# Patient Record
Sex: FEMALE | Race: WHITE | NOT HISPANIC OR LATINO
[De-identification: names, ages, dates, MRNs, and addresses within clinical notes are randomized per-mention and may not be internally consistent; named-entity substitution may affect disease eponyms.]

---

## 2020-12-02 ENCOUNTER — APPOINTMENT (OUTPATIENT)
Dept: SURGICAL ONCOLOGY | Facility: CLINIC | Age: 85
End: 2020-12-02
Payer: MEDICARE

## 2020-12-02 VITALS
HEART RATE: 91 BPM | HEIGHT: 66 IN | OXYGEN SATURATION: 98 % | BODY MASS INDEX: 24.11 KG/M2 | WEIGHT: 150 LBS | DIASTOLIC BLOOD PRESSURE: 81 MMHG | SYSTOLIC BLOOD PRESSURE: 154 MMHG

## 2020-12-02 DIAGNOSIS — Z85.820 PERSONAL HISTORY OF MALIGNANT MELANOMA OF SKIN: ICD-10-CM

## 2020-12-02 DIAGNOSIS — Z84.89 FAMILY HISTORY OF OTHER SPECIFIED CONDITIONS: ICD-10-CM

## 2020-12-02 DIAGNOSIS — Z85.828 PERSONAL HISTORY OF OTHER MALIGNANT NEOPLASM OF SKIN: ICD-10-CM

## 2020-12-02 DIAGNOSIS — Z80.0 FAMILY HISTORY OF MALIGNANT NEOPLASM OF DIGESTIVE ORGANS: ICD-10-CM

## 2020-12-02 DIAGNOSIS — Z78.9 OTHER SPECIFIED HEALTH STATUS: ICD-10-CM

## 2020-12-02 DIAGNOSIS — Z86.59 PERSONAL HISTORY OF OTHER MENTAL AND BEHAVIORAL DISORDERS: ICD-10-CM

## 2020-12-02 DIAGNOSIS — Z80.42 FAMILY HISTORY OF MALIGNANT NEOPLASM OF PROSTATE: ICD-10-CM

## 2020-12-02 DIAGNOSIS — Z87.39 PERSONAL HISTORY OF OTHER DISEASES OF THE MUSCULOSKELETAL SYSTEM AND CONNECTIVE TISSUE: ICD-10-CM

## 2020-12-02 DIAGNOSIS — Z86.69 PERSONAL HISTORY OF OTHER DISEASES OF THE NERVOUS SYSTEM AND SENSE ORGANS: ICD-10-CM

## 2020-12-02 DIAGNOSIS — Z86.79 PERSONAL HISTORY OF OTHER DISEASES OF THE CIRCULATORY SYSTEM: ICD-10-CM

## 2020-12-02 DIAGNOSIS — Z85.3 PERSONAL HISTORY OF MALIGNANT NEOPLASM OF BREAST: ICD-10-CM

## 2020-12-02 DIAGNOSIS — Z85.850 PERSONAL HISTORY OF MALIGNANT NEOPLASM OF THYROID: ICD-10-CM

## 2020-12-02 PROCEDURE — 99205 OFFICE O/P NEW HI 60 MIN: CPT | Mod: 25

## 2020-12-02 PROCEDURE — 11640 EXC F/E/E/N/L MAL+MRG 0.5CM<: CPT

## 2020-12-02 NOTE — ADDENDUM
[FreeTextEntry1] : I, Bina Li, acted solely as a scribe for Dr. Jay Woodson on this date 12/02/2020.\par

## 2020-12-02 NOTE — ASSESSMENT
[FreeTextEntry1] : T1a left cheek melanoma\par Outside report has incorrect staging (T1b)\par Recommend wide excision with plastic surgery reconstruction  - referred to Dr. Sanjay Krueger \par Punch biopsies performed today of the primary lesion to confirm staging and of the margins given the diffuse nature of the lesion and proximity to the mouth.\par Follow up with pathology report.\par Surgical procedure discussed in detail\par All questions answered

## 2020-12-02 NOTE — HISTORY OF PRESENT ILLNESS
[de-identified] : 84 y/o female presents with an initial consultation for a malignant melanoma. Pt. notes pigmented lesion left cheek near mouth for the past 1-2 yrs.  Pt. 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 \par

## 2020-12-02 NOTE — CONSULT LETTER
[Consult Letter:] : I had the pleasure of evaluating your patient, [unfilled]. [Please see my note below.] : Please see my note below. [Sincerely,] : Sincerely, [Dear  ___] : Dear  [unfilled], [FreeTextEntry3] : Jay Woodson MD FACS\par  [DrMadelin  ___] : Dr. COLEMAN

## 2020-12-02 NOTE — PROCEDURE
[FreeTextEntry1] : 3 mm punch biopsies performed on left cheek under sterile conditions using 1% plain lidocaine, no epinephrine. \par Left cheek pigmented lesion and margins 12,3,6,9 o'clock performed. \par Incision closed with 4 Monocryl simple sutures. \par Sterile dressing applied.\par

## 2020-12-11 ENCOUNTER — NON-APPOINTMENT (OUTPATIENT)
Age: 85
End: 2020-12-11

## 2020-12-11 LAB — CORE LAB BIOPSY: NORMAL

## 2020-12-17 ENCOUNTER — OUTPATIENT (OUTPATIENT)
Dept: OUTPATIENT SERVICES | Facility: HOSPITAL | Age: 85
LOS: 1 days | End: 2020-12-17
Payer: MEDICARE

## 2020-12-17 VITALS
HEART RATE: 81 BPM | SYSTOLIC BLOOD PRESSURE: 140 MMHG | DIASTOLIC BLOOD PRESSURE: 90 MMHG | WEIGHT: 149.03 LBS | RESPIRATION RATE: 16 BRPM | OXYGEN SATURATION: 98 % | HEIGHT: 65.25 IN | TEMPERATURE: 99 F

## 2020-12-17 DIAGNOSIS — E03.9 HYPOTHYROIDISM, UNSPECIFIED: ICD-10-CM

## 2020-12-17 DIAGNOSIS — C43.39 MALIGNANT MELANOMA OF OTHER PARTS OF FACE: ICD-10-CM

## 2020-12-17 DIAGNOSIS — C44.309 UNSPECIFIED MALIGNANT NEOPLASM OF SKIN OF OTHER PARTS OF FACE: ICD-10-CM

## 2020-12-17 DIAGNOSIS — Z86.79 PERSONAL HISTORY OF OTHER DISEASES OF THE CIRCULATORY SYSTEM: ICD-10-CM

## 2020-12-17 DIAGNOSIS — Z90.11 ACQUIRED ABSENCE OF RIGHT BREAST AND NIPPLE: Chronic | ICD-10-CM

## 2020-12-17 DIAGNOSIS — Z98.49 CATARACT EXTRACTION STATUS, UNSPECIFIED EYE: Chronic | ICD-10-CM

## 2020-12-17 DIAGNOSIS — Z95.0 PRESENCE OF CARDIAC PACEMAKER: Chronic | ICD-10-CM

## 2020-12-17 DIAGNOSIS — Z92.3 PERSONAL HISTORY OF IRRADIATION: Chronic | ICD-10-CM

## 2020-12-17 DIAGNOSIS — E89.0 POSTPROCEDURAL HYPOTHYROIDISM: Chronic | ICD-10-CM

## 2020-12-17 DIAGNOSIS — Z98.890 OTHER SPECIFIED POSTPROCEDURAL STATES: Chronic | ICD-10-CM

## 2020-12-17 LAB
ALBUMIN SERPL ELPH-MCNC: 4.2 G/DL — SIGNIFICANT CHANGE UP (ref 3.3–5)
ALP SERPL-CCNC: 59 U/L — SIGNIFICANT CHANGE UP (ref 40–120)
ALT FLD-CCNC: 9 U/L — SIGNIFICANT CHANGE UP (ref 4–33)
ANION GAP SERPL CALC-SCNC: 10 MMOL/L — SIGNIFICANT CHANGE UP (ref 7–14)
AST SERPL-CCNC: 15 U/L — SIGNIFICANT CHANGE UP (ref 4–32)
BILIRUB SERPL-MCNC: 0.5 MG/DL — SIGNIFICANT CHANGE UP (ref 0.2–1.2)
BUN SERPL-MCNC: 18 MG/DL — SIGNIFICANT CHANGE UP (ref 7–23)
CALCIUM SERPL-MCNC: 9.9 MG/DL — SIGNIFICANT CHANGE UP (ref 8.4–10.5)
CHLORIDE SERPL-SCNC: 105 MMOL/L — SIGNIFICANT CHANGE UP (ref 98–107)
CO2 SERPL-SCNC: 28 MMOL/L — SIGNIFICANT CHANGE UP (ref 22–31)
CREAT SERPL-MCNC: 0.99 MG/DL — SIGNIFICANT CHANGE UP (ref 0.5–1.3)
GLUCOSE SERPL-MCNC: 74 MG/DL — SIGNIFICANT CHANGE UP (ref 70–99)
HCT VFR BLD CALC: 44.9 % — SIGNIFICANT CHANGE UP (ref 34.5–45)
HGB BLD-MCNC: 14.1 G/DL — SIGNIFICANT CHANGE UP (ref 11.5–15.5)
MCHC RBC-ENTMCNC: 29 PG — SIGNIFICANT CHANGE UP (ref 27–34)
MCHC RBC-ENTMCNC: 31.4 GM/DL — LOW (ref 32–36)
MCV RBC AUTO: 92.2 FL — SIGNIFICANT CHANGE UP (ref 80–100)
NRBC # BLD: 0 /100 WBCS — SIGNIFICANT CHANGE UP
NRBC # FLD: 0 K/UL — SIGNIFICANT CHANGE UP
PLATELET # BLD AUTO: 361 K/UL — SIGNIFICANT CHANGE UP (ref 150–400)
POTASSIUM SERPL-MCNC: 3.6 MMOL/L — SIGNIFICANT CHANGE UP (ref 3.5–5.3)
POTASSIUM SERPL-SCNC: 3.6 MMOL/L — SIGNIFICANT CHANGE UP (ref 3.5–5.3)
PROT SERPL-MCNC: 6.9 G/DL — SIGNIFICANT CHANGE UP (ref 6–8.3)
RBC # BLD: 4.87 M/UL — SIGNIFICANT CHANGE UP (ref 3.8–5.2)
RBC # FLD: 13.8 % — SIGNIFICANT CHANGE UP (ref 10.3–14.5)
SODIUM SERPL-SCNC: 143 MMOL/L — SIGNIFICANT CHANGE UP (ref 135–145)
WBC # BLD: 5.58 K/UL — SIGNIFICANT CHANGE UP (ref 3.8–10.5)
WBC # FLD AUTO: 5.58 K/UL — SIGNIFICANT CHANGE UP (ref 3.8–10.5)

## 2020-12-17 PROCEDURE — 93010 ELECTROCARDIOGRAM REPORT: CPT

## 2020-12-17 NOTE — H&P PST ADULT - HISTORY OF PRESENT ILLNESS
86 y/o female presents to PST preop for wide excision left cheek melanoma on 12/22/20. preop dx of malignant melanoma of other parts of face. pt noticed dark mole to her left cheek. s/p biopsy and pathology which confirmed malignant melanoma. 86 y/o female presents to PST preop for wide excision left cheek melanoma on 12/22/20. preop dx of malignant melanoma of other parts of face. pt noticed dark mole to her left cheek. s/p biopsy. pathology confirmed malignant melanoma. now for surgery.

## 2020-12-17 NOTE — H&P PST ADULT - NS MD HP INPLANTS MED DEV
Biotronik [facility] Eluna 8 DR-T PRO MRI [id number] Implant date 6/5/17, pessary/Hearing aid/Lens implant/Pacemaker

## 2020-12-17 NOTE — H&P PST ADULT - NEGATIVE ENMT SYMPTOMS
no ear pain/no tinnitus/no vertigo/no sinus symptoms/no dysphagia no ear pain/no tinnitus/no vertigo/no sinus symptoms/no nasal discharge/no dysphagia

## 2020-12-17 NOTE — H&P PST ADULT - NSANTHOSAYNRD_GEN_A_CORE
No. AXEL screening performed.  STOP BANG Legend: 0-2 = LOW Risk; 3-4 = INTERMEDIATE Risk; 5-8 = HIGH Risk

## 2020-12-17 NOTE — H&P PST ADULT - ITE SK HX ROS MEA POS PC
left cheek/dryness/change in size/color of mole left cheek melanoma/dryness/change in size/color of mole

## 2020-12-17 NOTE — H&P PST ADULT - NSICDXPASTSURGICALHX_GEN_ALL_CORE_FT
PAST SURGICAL HISTORY:  H/O cataract extraction     H/O craniotomy 2013    H/O ovarian cystectomy 2 yrs old    H/O right mastectomy 1995    S/P cardiac pacemaker procedure 2017    S/P total thyroidectomy 1993    Status post stereotactic radiosurgery 2011

## 2020-12-17 NOTE — H&P PST ADULT - NSICDXPASTMEDICALHX_GEN_ALL_CORE_FT
PAST MEDICAL HISTORY:  Acquired hypothyroidism     Anemia     Breast cancer right breast 1995    CA skin, basal cell     Cardiac arrhythmia     Deafness left ear    H/O mitral valve prolapse     H/O squamous cell carcinoma of skin     Hard of hearing     HLD (hyperlipidemia)     Left acoustic neuroma     Malignant melanoma     Pacemaker     Thyroid cancer      PAST MEDICAL HISTORY:  Acquired hypothyroidism     Anxiety     Breast cancer right breast 1995    CA skin, basal cell     Cardiac arrhythmia     Deafness left ear    H/O mitral valve prolapse     H/O squamous cell carcinoma of skin     Hard of hearing     HLD (hyperlipidemia)     Left acoustic neuroma     Malignant melanoma     Pacemaker     Thyroid cancer

## 2020-12-17 NOTE — H&P PST ADULT - ENMT COMMENTS
pt with left ear deafness after surgery for left acoustic neuroma-  pt with crossover hearing device and right ear hearing aid

## 2020-12-17 NOTE — H&P PST ADULT - NSICDXPROBLEM_GEN_ALL_CORE_FT
PROBLEM DIAGNOSES  Problem: Malignant neoplasm of skin of other parts of face  Assessment and Plan: preop for wide excision left cheek melanoma on 12/22/20  preop instructions given, pt verbalized understanding  GI prophylaxis provided  pt is scheduled for COVID testing preop    Problem: H/O cardiac arrhythmia  Assessment and Plan: Last cardiac visit note requested  Last ECHO report and pacemaker report requested from electrophysiologist  pt with Biotronik PPM Eluna 8 DT-T ProMRI [id number]- OR booking notified via fax     Problem: Acquired hypothyroidism  Assessment and Plan: pt will take  synthroid AM of surgery as prescribed       PROBLEM DIAGNOSES  Problem: Malignant neoplasm of skin of other parts of face  Assessment and Plan: preop for wide excision left cheek melanoma on 12/22/20  preop instructions given, pt verbalized understanding  GI prophylaxis provided  pt is scheduled for COVID testing preop  medical clearance requested     Problem: Acquired hypothyroidism  Assessment and Plan: pt will take  synthroid AM of surgery as prescribed    Problem: H/O cardiac arrhythmia  Assessment and Plan: Last cardiac visit note requested  Last ECHO report and pacemaker report requested from electrophysiologist  pt with Biotronik PPM Eluna 8 DT-T ProMRI [id number]- OR booking notified via fax

## 2020-12-17 NOTE — H&P PST ADULT - NEGATIVE NEUROLOGICAL SYMPTOMS
no transient paralysis/no weakness/no paresthesias/no generalized seizures/no focal seizures/no syncope/no tremors/no loss of sensation/no difficulty walking/no headache/no loss of consciousness/no hemiparesis/no confusion/no facial palsy

## 2020-12-19 ENCOUNTER — APPOINTMENT (OUTPATIENT)
Dept: DISASTER EMERGENCY | Facility: CLINIC | Age: 85
End: 2020-12-19

## 2020-12-19 DIAGNOSIS — Z01.818 ENCOUNTER FOR OTHER PREPROCEDURAL EXAMINATION: ICD-10-CM

## 2020-12-21 ENCOUNTER — TRANSCRIPTION ENCOUNTER (OUTPATIENT)
Age: 85
End: 2020-12-21

## 2020-12-21 LAB — SARS-COV-2 N GENE NPH QL NAA+PROBE: NOT DETECTED

## 2020-12-22 ENCOUNTER — RESULT REVIEW (OUTPATIENT)
Age: 85
End: 2020-12-22

## 2020-12-22 ENCOUNTER — OUTPATIENT (OUTPATIENT)
Dept: OUTPATIENT SERVICES | Facility: HOSPITAL | Age: 85
LOS: 1 days | Discharge: HOME | End: 2020-12-22
Payer: MEDICARE

## 2020-12-22 ENCOUNTER — APPOINTMENT (OUTPATIENT)
Dept: SURGICAL ONCOLOGY | Facility: AMBULATORY SURGERY CENTER | Age: 85
End: 2020-12-22

## 2020-12-22 VITALS
SYSTOLIC BLOOD PRESSURE: 153 MMHG | HEART RATE: 81 BPM | OXYGEN SATURATION: 99 % | RESPIRATION RATE: 16 BRPM | DIASTOLIC BLOOD PRESSURE: 63 MMHG

## 2020-12-22 VITALS
TEMPERATURE: 98 F | RESPIRATION RATE: 16 BRPM | DIASTOLIC BLOOD PRESSURE: 78 MMHG | OXYGEN SATURATION: 100 % | SYSTOLIC BLOOD PRESSURE: 171 MMHG | HEART RATE: 96 BPM | WEIGHT: 149.03 LBS | HEIGHT: 65.25 IN

## 2020-12-22 DIAGNOSIS — Z90.11 ACQUIRED ABSENCE OF RIGHT BREAST AND NIPPLE: Chronic | ICD-10-CM

## 2020-12-22 DIAGNOSIS — Z98.49 CATARACT EXTRACTION STATUS, UNSPECIFIED EYE: Chronic | ICD-10-CM

## 2020-12-22 DIAGNOSIS — E89.0 POSTPROCEDURAL HYPOTHYROIDISM: Chronic | ICD-10-CM

## 2020-12-22 DIAGNOSIS — Z98.890 OTHER SPECIFIED POSTPROCEDURAL STATES: Chronic | ICD-10-CM

## 2020-12-22 DIAGNOSIS — Z92.3 PERSONAL HISTORY OF IRRADIATION: Chronic | ICD-10-CM

## 2020-12-22 DIAGNOSIS — Z95.0 PRESENCE OF CARDIAC PACEMAKER: Chronic | ICD-10-CM

## 2020-12-22 DIAGNOSIS — C43.39 MALIGNANT MELANOMA OF OTHER PARTS OF FACE: ICD-10-CM

## 2020-12-22 PROCEDURE — 88305 TISSUE EXAM BY PATHOLOGIST: CPT | Mod: 26

## 2020-12-22 PROCEDURE — 21016 RESECT FACE/SCALP TUM 2 CM/>: CPT

## 2020-12-22 PROCEDURE — 88341 IMHCHEM/IMCYTCHM EA ADD ANTB: CPT | Mod: 26

## 2020-12-22 PROCEDURE — 88342 IMHCHEM/IMCYTCHM 1ST ANTB: CPT | Mod: 26

## 2020-12-22 RX ORDER — ASCORBIC ACID 60 MG
1 TABLET,CHEWABLE ORAL
Qty: 0 | Refills: 0 | DISCHARGE

## 2020-12-22 RX ORDER — MUPIROCIN 20 MG/G
1 OINTMENT TOPICAL
Qty: 22 | Refills: 0
Start: 2020-12-22 | End: 2020-12-28

## 2020-12-22 RX ORDER — LEVOTHYROXINE SODIUM 125 MCG
0 TABLET ORAL
Qty: 0 | Refills: 0 | DISCHARGE

## 2020-12-22 RX ORDER — ATORVASTATIN CALCIUM 80 MG/1
1 TABLET, FILM COATED ORAL
Qty: 0 | Refills: 0 | DISCHARGE

## 2020-12-22 RX ORDER — LEVOTHYROXINE SODIUM 125 MCG
1 TABLET ORAL
Qty: 0 | Refills: 0 | DISCHARGE

## 2020-12-22 RX ORDER — CHOLECALCIFEROL (VITAMIN D3) 125 MCG
1 CAPSULE ORAL
Qty: 0 | Refills: 0 | DISCHARGE

## 2020-12-22 RX ORDER — ASPIRIN/CALCIUM CARB/MAGNESIUM 324 MG
1 TABLET ORAL
Qty: 0 | Refills: 0 | DISCHARGE

## 2020-12-22 NOTE — BRIEF OPERATIVE NOTE - NSICDXBRIEFPROCEDURE_GEN_ALL_CORE_FT
PROCEDURES:  Excision, melanoma, face, with sentinel lymph node biopsy 22-Dec-2020 15:57:42 Wide excision of left cheek melanoma with primary closure by Plastics Chester Kiser

## 2020-12-22 NOTE — ASU DISCHARGE PLAN (ADULT/PEDIATRIC) - ASU DC SPECIAL INSTRUCTIONSFT
Please call the office [phone] to make an appointment with Dr. Shikowitz-Behr in 1 week from discharge to remove the sutures.     Your wound is closed in 2 layers: one is internal and the outside layer has stitches that will come out in the office next week. You have steri strips on top of it that you allow running water, but please do not scrub or put special ointments. You can shower tomorrow. If the steri strips come off on their own, please apply mupirocin ointment on top of it.     Please take one tab of percocet every 6 hours as needed for severe pain. Please take extra strength tylenol every 6 hours for pain control. Please take one tab of antibiotic twice a day for a total of 7 days.

## 2020-12-22 NOTE — ASU DISCHARGE PLAN (ADULT/PEDIATRIC) - CARE PROVIDER_API CALL
ShikowitzBehr, Lauren B  SURGERY  [address] [facility], [address]  [place] [address]  Phone: [phone]  Fax: [phone]  Follow Up Time: 1 week

## 2020-12-22 NOTE — ASU DISCHARGE PLAN (ADULT/PEDIATRIC) - CALL YOUR DOCTOR IF YOU HAVE ANY OF THE FOLLOWING:
Bleeding that does not stop/Swelling that gets worse/Pain not relieved by Medications/Fever greater than (need to indicate Fahrenheit or Celsius)/Wound/Surgical Site with redness, or foul smelling discharge or pus/Numbness, tingling, color or temperature change to extremity Bleeding that does not stop/Swelling that gets worse/Pain not relieved by Medications/Fever greater than (need to indicate Fahrenheit or Celsius)/Wound/Surgical Site with redness, or foul smelling discharge or pus/Numbness, tingling, color or temperature change to extremity/Nausea and vomiting that does not stop/Inability to tolerate liquids or foods

## 2021-01-05 LAB — SURGICAL PATHOLOGY STUDY: SIGNIFICANT CHANGE UP

## 2021-01-07 PROBLEM — E03.9 HYPOTHYROIDISM, UNSPECIFIED: Chronic | Status: ACTIVE | Noted: 2020-12-17

## 2021-01-07 PROBLEM — H91.90 UNSPECIFIED HEARING LOSS, UNSPECIFIED EAR: Chronic | Status: ACTIVE | Noted: 2020-12-17

## 2021-01-07 PROBLEM — Z86.79 PERSONAL HISTORY OF OTHER DISEASES OF THE CIRCULATORY SYSTEM: Chronic | Status: ACTIVE | Noted: 2020-12-17

## 2021-01-07 PROBLEM — C50.919 MALIGNANT NEOPLASM OF UNSPECIFIED SITE OF UNSPECIFIED FEMALE BREAST: Chronic | Status: ACTIVE | Noted: 2020-12-17

## 2021-01-07 PROBLEM — Z85.828 PERSONAL HISTORY OF OTHER MALIGNANT NEOPLASM OF SKIN: Chronic | Status: ACTIVE | Noted: 2020-12-17

## 2021-01-07 PROBLEM — I49.9 CARDIAC ARRHYTHMIA, UNSPECIFIED: Chronic | Status: ACTIVE | Noted: 2020-12-17

## 2021-01-07 PROBLEM — Z95.0 PRESENCE OF CARDIAC PACEMAKER: Chronic | Status: ACTIVE | Noted: 2020-12-17

## 2021-01-07 PROBLEM — C43.9 MALIGNANT MELANOMA OF SKIN, UNSPECIFIED: Chronic | Status: ACTIVE | Noted: 2020-12-17

## 2021-01-07 PROBLEM — C44.91 BASAL CELL CARCINOMA OF SKIN, UNSPECIFIED: Chronic | Status: ACTIVE | Noted: 2020-12-17

## 2021-01-07 PROBLEM — D33.3 BENIGN NEOPLASM OF CRANIAL NERVES: Chronic | Status: ACTIVE | Noted: 2020-12-17

## 2021-01-07 PROBLEM — C73 MALIGNANT NEOPLASM OF THYROID GLAND: Chronic | Status: ACTIVE | Noted: 2020-12-17

## 2021-01-07 PROBLEM — E78.5 HYPERLIPIDEMIA, UNSPECIFIED: Chronic | Status: ACTIVE | Noted: 2020-12-17

## 2021-01-07 PROBLEM — F41.9 ANXIETY DISORDER, UNSPECIFIED: Chronic | Status: ACTIVE | Noted: 2020-12-17

## 2021-01-08 ENCOUNTER — NON-APPOINTMENT (OUTPATIENT)
Age: 86
End: 2021-01-08

## 2021-01-12 ENCOUNTER — APPOINTMENT (OUTPATIENT)
Dept: SURGICAL ONCOLOGY | Facility: HOSPITAL | Age: 86
End: 2021-01-12

## 2021-01-22 ENCOUNTER — APPOINTMENT (OUTPATIENT)
Dept: SURGICAL ONCOLOGY | Facility: CLINIC | Age: 86
End: 2021-01-22
Payer: MEDICARE

## 2021-01-22 VITALS
SYSTOLIC BLOOD PRESSURE: 169 MMHG | WEIGHT: 150 LBS | HEART RATE: 107 BPM | TEMPERATURE: 98.3 F | HEIGHT: 66 IN | BODY MASS INDEX: 24.11 KG/M2 | RESPIRATION RATE: 16 BRPM | DIASTOLIC BLOOD PRESSURE: 99 MMHG | OXYGEN SATURATION: 100 %

## 2021-01-22 PROCEDURE — 99024 POSTOP FOLLOW-UP VISIT: CPT

## 2021-04-21 ENCOUNTER — APPOINTMENT (OUTPATIENT)
Dept: SURGICAL ONCOLOGY | Facility: CLINIC | Age: 86
End: 2021-04-21
Payer: MEDICARE

## 2021-04-21 VITALS
RESPIRATION RATE: 16 BRPM | DIASTOLIC BLOOD PRESSURE: 81 MMHG | HEART RATE: 99 BPM | WEIGHT: 138 LBS | OXYGEN SATURATION: 100 % | HEIGHT: 66 IN | BODY MASS INDEX: 22.18 KG/M2 | SYSTOLIC BLOOD PRESSURE: 179 MMHG

## 2021-04-21 PROCEDURE — 99072 ADDL SUPL MATRL&STAF TM PHE: CPT

## 2021-04-21 PROCEDURE — 99214 OFFICE O/P EST MOD 30 MIN: CPT

## 2021-04-21 NOTE — PHYSICAL EXAM
[Normal] : supple, no neck mass and thyroid not enlarged [Normal Neck Lymph Nodes] : normal neck lymph nodes  [Normal Supraclavicular Lymph Nodes] : normal supraclavicular lymph nodes [Normal Groin Lymph Nodes] : normal groin lymph nodes [Normal Axillary Lymph Nodes] : normal axillary lymph nodes [Normal] : oriented to person, place and time, with appropriate affect [de-identified] : Left cheek reconstruction well-healed, no evidence of recurrence, pigmented nodular lesion right ring finger dorsal aspect noted by patient as changing, no other suspicious skin lesions

## 2021-04-21 NOTE — HISTORY OF PRESENT ILLNESS
[de-identified] : 84 y/o female presents for a melanoma follow up visit. \par Pt. noted a pigmented lesion left cheek near mouth for the past 1-2 yrs.  Pt. 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 \par \par She is status post wide excision of left cheek melanoma on 20.  Plastic reconstruction was performed by Dr. Marisela Maxwell.  Final pathology was 1.2 mm melanoma with a mitotic rate of 1/mm^2, with no ulceration or regression.  Given the final pathology I discussed a sentinel lymph node biopsy given change in Breslow thickness.\par

## 2021-04-21 NOTE — ASSESSMENT
[FreeTextEntry1] : Wide excision of T2 melanoma from the left cheek on 12/22/20- negative margins.\par LENCHO\par Deary node biopsy not performed\par Left neck ultrasound performed in the office today negative for suspicious lymphadenopathy\par Changing pigmented lesion right ring finger–patient will follow up with Dr. Lepe of dermatology for biopsy\par RTO 3 months\par

## 2021-07-28 ENCOUNTER — APPOINTMENT (OUTPATIENT)
Dept: SURGICAL ONCOLOGY | Facility: CLINIC | Age: 86
End: 2021-07-28
Payer: MEDICARE

## 2021-07-28 VITALS
OXYGEN SATURATION: 96 % | DIASTOLIC BLOOD PRESSURE: 50 MMHG | HEART RATE: 86 BPM | BODY MASS INDEX: 22.18 KG/M2 | HEIGHT: 66 IN | RESPIRATION RATE: 17 BRPM | SYSTOLIC BLOOD PRESSURE: 170 MMHG | WEIGHT: 138 LBS

## 2021-07-28 PROCEDURE — 99072 ADDL SUPL MATRL&STAF TM PHE: CPT

## 2021-07-28 PROCEDURE — 99214 OFFICE O/P EST MOD 30 MIN: CPT

## 2021-07-28 NOTE — HISTORY OF PRESENT ILLNESS
[de-identified] : 85 y/o female presents for a melanoma follow up visit. 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  Plastic reconstruction was performed by Dr. Marisela Maxwell.  Final pathology was 1.2 mm melanoma with a mitotic rate of 1/mm^2, with no ulceration or regression. We decided to not proceed with a sentinel lymph node biopsy due to low risk for mitosis. \par \par \par Pathology (21):\par Left cheek, punch biopsy:\par -Melanoma, approximately 0.7 mm in thickness, mitotic rate 0/mm2\par -No ulceration and no regression changes\par -Tumor staging pT1a \par \par Final Pathology (20): \par 1 - Left cheek, wide excision:\par - Melanoma, 1.2 mm in thickness, mitotic rate 1/mm2\par - No ulceration and no regresssion changes\par - Tumor staging pT2a\par \par Dermatology report (20):\par Left Cheek, Punch Biopsy\par Malignant Melanoma.\par Breslow's Thickness: 0.7 mm\par Mitosis per 1mm2: None in dermal component\par Lymphoid infiltrate: 1+\par Ulceration: absent\par Regression: absent\par Margins: extends to both lateral margins\par Immunohistochemical stains: confirmed with Melan-A, MITF, HMB-45\par Stage: T1B\par \par Pt has a family history of skin cancer (brother) and melanoma (maternal uncle) -  from same.

## 2021-07-28 NOTE — ASSESSMENT
[FreeTextEntry1] : T2a left cheek melanoma-s/p wide excision 12/22/20\par LENCHO\par Mildly prominent right cervical lymph node secondary to right broken tooth\par Patient scheduled for dental work-up next week \par RTO December 2021 after yearly blood work

## 2021-07-28 NOTE — ADDENDUM
[FreeTextEntry1] : I, Bina Li, acted solely as a scribe for Dr. Jay Woodson on this date 07/28/2021.\par

## 2021-07-28 NOTE — PHYSICAL EXAM
[Normal] : supple, no neck mass and thyroid not enlarged [Normal Groin Lymph Nodes] : normal groin lymph nodes [Normal Axillary Lymph Nodes] : normal axillary lymph nodes [Normal] : oriented to person, place and time, with appropriate affect [Normal Supraclavicular Lymph Nodes] : normal supraclavicular lymph nodes [de-identified] : mild right level 2 cervical lymph node. us shows prominent subglandular node. no suspicious adenopathy.  [de-identified] : Left cheek reconstruction well-healed, no evidence of recurrence, no other suspicious skin lesions.

## 2021-08-23 ENCOUNTER — APPOINTMENT (OUTPATIENT)
Dept: CARDIOLOGY | Facility: CLINIC | Age: 86
End: 2021-08-23
Payer: MEDICARE

## 2021-08-23 ENCOUNTER — NON-APPOINTMENT (OUTPATIENT)
Age: 86
End: 2021-08-23

## 2021-08-23 DIAGNOSIS — Z78.9 OTHER SPECIFIED HEALTH STATUS: ICD-10-CM

## 2021-08-23 DIAGNOSIS — R55 SYNCOPE AND COLLAPSE: ICD-10-CM

## 2021-08-23 DIAGNOSIS — Z87.891 PERSONAL HISTORY OF NICOTINE DEPENDENCE: ICD-10-CM

## 2021-08-23 DIAGNOSIS — Z86.018 PERSONAL HISTORY OF OTHER BENIGN NEOPLASM: ICD-10-CM

## 2021-08-23 DIAGNOSIS — Z82.3 FAMILY HISTORY OF STROKE: ICD-10-CM

## 2021-08-23 PROCEDURE — 99204 OFFICE O/P NEW MOD 45 MIN: CPT

## 2021-08-23 PROCEDURE — 93000 ELECTROCARDIOGRAM COMPLETE: CPT

## 2021-08-23 RX ORDER — NITROFURANTOIN (MONOHYDRATE/MACROCRYSTALS) 25; 75 MG/1; MG/1
100 CAPSULE ORAL
Qty: 10 | Refills: 0 | Status: DISCONTINUED | COMMUNITY
Start: 2021-05-10 | End: 2021-08-23

## 2021-08-23 RX ORDER — ONDANSETRON 4 MG/1
4 TABLET, ORALLY DISINTEGRATING ORAL
Qty: 20 | Refills: 0 | Status: DISCONTINUED | COMMUNITY
Start: 2021-05-13 | End: 2021-08-23

## 2021-08-23 RX ORDER — DOXYCYCLINE HYCLATE 100 MG/1
100 TABLET ORAL
Qty: 12 | Refills: 0 | Status: DISCONTINUED | COMMUNITY
Start: 2021-06-21 | End: 2021-08-23

## 2021-08-23 RX ORDER — ASPIRIN 325 MG/1
TABLET, FILM COATED ORAL
Refills: 0 | Status: DISCONTINUED | COMMUNITY
End: 2021-08-23

## 2021-08-23 RX ORDER — CEPHALEXIN 500 MG/1
500 CAPSULE ORAL
Qty: 14 | Refills: 0 | Status: DISCONTINUED | COMMUNITY
Start: 2021-08-05 | End: 2021-08-23

## 2021-08-23 RX ORDER — SULFAMETHOXAZOLE AND TRIMETHOPRIM 800; 160 MG/1; MG/1
800-160 TABLET ORAL
Qty: 14 | Refills: 0 | Status: DISCONTINUED | COMMUNITY
Start: 2021-05-11 | End: 2021-08-23

## 2021-08-23 RX ORDER — CIPROFLOXACIN HYDROCHLORIDE 500 MG/1
500 TABLET, FILM COATED ORAL
Qty: 14 | Refills: 0 | Status: DISCONTINUED | COMMUNITY
Start: 2021-05-11 | End: 2021-08-23

## 2021-08-23 RX ORDER — ESTRADIOL 0.1 MG/G
0.1 CREAM VAGINAL
Qty: 42 | Refills: 0 | Status: DISCONTINUED | COMMUNITY
Start: 2021-05-13 | End: 2021-08-23

## 2021-08-25 PROBLEM — R55 SYNCOPE AND COLLAPSE: Status: ACTIVE | Noted: 2021-08-25

## 2021-08-28 NOTE — HISTORY OF PRESENT ILLNESS
[FreeTextEntry1] : AMAYA CARPENTER  is a 86 year old  F\par \par Difficult historian.  \par Initial cardiology evaluation.  Referred by primary physician.  \par Previously had cardiovascular care in [facility] with Dr. Melgoza.  Presents to establish local cardiovascular care.  \par She now has more difficulty traveling to [facility].  \par Has a history of History of syncope and heart block, underwent pacemaker in 2017.  Biotronik pacemaker by Modesto Grier at [facility].\par History of ventricular ectopy including PVCs and nonsustained ventricular tachycardia. \par EKG demonstrates sinus a paced rhythm.\par Last outside EKG demonstrates intrinsic rhythm.  \par Last blood work March 2021 total cholesterol 151, LDL 82, creatinine 0.9, potassium 4.6, hemoglobin 13.5 \par last outside echocardiogram mild to moderate mitral regurgitation \par last office visit [facility] cardiology September 2020.  \par Previously her device has been notable for transient atrial fibrillation.  Started on NOAC.  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   \par There is no prior history of a clinical myocardial infarction, coronary revascularization. \par There is no history of symptomatic congestive heart failure\par \par Tries to remain active with walking.  \par Has symptoms of fatigue.  \par At times she is aware of extra heartbeat.\par There is no exertional chest pain, pressure or discomfort. \par There is no significant dyspnea on exertion or orthopnea. \par There are no symptomatic palpitations, lightheadedness, dizziness or syncope.\par \par Has allergy to contrast including rash.  \par Family history of stroke. 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 \par \par Blood work August 2021.  Creatinine 1.0, TSH 0.2, hemoglobin 13.3  \par \par

## 2021-08-28 NOTE — ASSESSMENT
[FreeTextEntry1] : Syncope/heart block s/p Permanent pacemaker.  Ventricular ectopy.  Paroxysmal atrial fibrillation.  Valvular heart disease.  Mitral valve prolapse with regurgitation.   Hyperlipidemia.  \par \par Follow-up stress test. Pharmacologic vasodilator study.\par Monitor degree of regurgitation, LV size and function. \par Does not require SBE prophylaxis.  \par Continue statin.  \par Continue anticoagulation.  Reviewed fall and bleeding precautions.  Monitor CBC and renal function. \par Monitor thyroid replacement.  \par Adjunctive dietary measures.  \par Offered to follow device locally.  \par Monitor device to determine need for rhythm control strategy.   \par clinical follow-up will be scheduled after the stress test.  \par All the patient's questions have been answered.

## 2021-09-07 ENCOUNTER — APPOINTMENT (OUTPATIENT)
Dept: CARDIOLOGY | Facility: CLINIC | Age: 86
End: 2021-09-07
Payer: MEDICARE

## 2021-09-07 PROCEDURE — 93015 CV STRESS TEST SUPVJ I&R: CPT

## 2021-09-07 PROCEDURE — 78452 HT MUSCLE IMAGE SPECT MULT: CPT

## 2021-09-07 PROCEDURE — A9502: CPT

## 2021-09-13 ENCOUNTER — NON-APPOINTMENT (OUTPATIENT)
Age: 86
End: 2021-09-13

## 2021-09-13 ENCOUNTER — APPOINTMENT (OUTPATIENT)
Dept: CARDIOLOGY | Facility: CLINIC | Age: 86
End: 2021-09-13
Payer: MEDICARE

## 2021-09-13 VITALS
DIASTOLIC BLOOD PRESSURE: 80 MMHG | WEIGHT: 141 LBS | SYSTOLIC BLOOD PRESSURE: 110 MMHG | HEART RATE: 87 BPM | BODY MASS INDEX: 22.66 KG/M2 | OXYGEN SATURATION: 97 % | HEIGHT: 66 IN

## 2021-09-13 PROCEDURE — 93000 ELECTROCARDIOGRAM COMPLETE: CPT

## 2021-09-13 PROCEDURE — 99215 OFFICE O/P EST HI 40 MIN: CPT

## 2021-09-13 RX ORDER — APIXABAN 5 MG/1
5 TABLET, FILM COATED ORAL
Refills: 0 | Status: DISCONTINUED | COMMUNITY
Start: 2021-07-22 | End: 2021-09-13

## 2021-09-13 RX ORDER — LEVOTHYROXINE SODIUM 0.12 MG/1
125 TABLET ORAL
Refills: 0 | Status: DISCONTINUED | COMMUNITY
End: 2021-09-13

## 2021-09-14 NOTE — ASSESSMENT
[FreeTextEntry1] : AMAYA CARPENTER is a 86 year old F who presents today Sep 14, 2021 here to review cardiovascular test results. \par \par Syncope/heart block s/p Permanent pacemaker.  Ventricular ectopy. \par Stress testing showed no significant ischemia. There was a fixed inferior defect. Pt's fatigue has improved with adjustment of thyroid rx by endo. There is no angina. \par Reviewed limitations of noninvasive testing and if any new or worsening symptoms should occur advised her to notify our office immediately for further evaluation. \par Monitor thyroid replacement.  \par \par PAF. Fhx CVA. \par Very brief episodes, last 6 weeks ago on device noted today. Cont remote monitoring. \par Educated patient on pathophysiology of atrial fibrillation and natural progression as well as associated risk of cardioembolic event. Informed her/him on indications for long term anticoagulation. \par Will consult w/ PCP re hematuria, UA results, and plan for urology involvement. \par GIven reduced GFR will change NOAC to xarelto 15mg daily. \par Monitor CBC. Reviewed bleeding precautions. \par \par MR/MVP \par Monitor degree of regurgitation, LV size and function. Planned echo in 2 months at outside office req a copy. \par Monitor VHD\par Does not require SBE prophylaxis.  \par \par HLD. Elevated Lp(a). \par Continue statin.  More aggressive LDL goals. \par Continue anticoagulation.  Reviewed fall and bleeding precautions.  Monitor CBC and renal function. \par \par Adjunctive dietary measures.  \par Offered to follow device locally.  She wishes to cont to coordinate care w/ [facility]. \par Monitor device to determine need for rhythm control strategy.   \par \par All the patient's questions have been answered.\par \par Sincerely,\par \par CAROLA Walsh\par Patients history, testing, and plan reviewed with supervising MD: Dr. Santos Garcia

## 2021-09-14 NOTE — ADDENDUM
[FreeTextEntry1] : Please note the patient was seen and examined with NP Fartun King\aguila I was physically present during the service of the patient and personally examined the patient. \aguila I was directly involved in the management plan and recommendations of the care provided to the patient. \aguila I personally reviewed the history and physical examination as documented by the NP above.\par 09/13/2021\par \par Xarelto 15, echo. device f/u

## 2021-09-14 NOTE — HISTORY OF PRESENT ILLNESS
[FreeTextEntry1] : AMAYA CARPENTER  is a 86 year old  F here to review cardiovascular test results. \par \par Difficult historian.  \par Initial cardiology evaluation.  Referred by primary physician.  \par Previously had cardiovascular care in [facility] with Dr. Melgoza.  Presents to establish local cardiovascular care.  \par She now has more difficulty traveling to [facility].  \par Has a history of History of syncope and heart block, underwent pacemaker in 2017.  Biotronik pacemaker by Modesto Grier at [facility].\par History of ventricular ectopy including PVCs and nonsustained ventricular tachycardia. \par \par last outside echocardiogram mild to moderate mitral regurgitation \par remains under care of [facility] cardiology last September 2020.  \par Previously her device has been notable for transient atrial fibrillation.  Started on NOAC.  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   \par There is no prior history of a clinical myocardial infarction, coronary revascularization. \par There is no history of symptomatic congestive heart failure\par \par Tries to remain active with walking.  \par Has symptoms of fatigue.  Although this has improved after recent adjustment of her thyroid medication. \par At times she is aware of extra heartbeat.\par There is no exertional chest pain, pressure or discomfort. \par There is no significant dyspnea on exertion or orthopnea. \par There are no symptomatic palpitations, lightheadedness, dizziness or syncope.\par \par Interim developed hematuria while on eliquis 5mg BID. PCP had her stop it. UA was obtained pending. \par She has bladder prolapse likely plan to involve urology. \par Hematuria has since stopped. \par Has allergy to contrast including rash.  \par Family history of stroke. 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 \par \par Last blood work March 2021 total cholesterol 151, LDL 82, creatinine 0.9, potassium 4.6, hemoglobin 13.5 \par Blood work August 2021.  Creatinine 1.0, TSH 0.2, hemoglobin 13.3  \par \par Nuclear stress test small mild to moderate basal inferior fixed defect, mild global hypokinesis with stress. No ischemia. \par

## 2021-12-06 ENCOUNTER — APPOINTMENT (OUTPATIENT)
Dept: CARDIOLOGY | Facility: CLINIC | Age: 86
End: 2021-12-06
Payer: MEDICARE

## 2021-12-06 VITALS
HEIGHT: 66 IN | DIASTOLIC BLOOD PRESSURE: 82 MMHG | WEIGHT: 138 LBS | BODY MASS INDEX: 22.18 KG/M2 | HEART RATE: 90 BPM | SYSTOLIC BLOOD PRESSURE: 134 MMHG | OXYGEN SATURATION: 99 % | TEMPERATURE: 97.5 F

## 2021-12-06 PROCEDURE — 99214 OFFICE O/P EST MOD 30 MIN: CPT

## 2021-12-06 RX ORDER — RIVAROXABAN 15 MG/1
15 TABLET, FILM COATED ORAL
Qty: 90 | Refills: 0 | Status: DISCONTINUED | COMMUNITY
Start: 2021-09-13 | End: 2021-12-06

## 2021-12-06 NOTE — REVIEW OF SYSTEMS
[Dyspnea on exertion] : dyspnea during exertion [Change In The Stool] : change in stool [Blood in Stool] : blood in stool [Myalgia] : myalgia [Negative] : Constitutional

## 2021-12-10 NOTE — ASSESSMENT
[FreeTextEntry1] : H/o syncope and heart block, underwent pacemaker in 2017.  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 Ventricular ectopy including PVCs and nonsustained ventricular tachycardia. \par \par Allergy to contrast \par Reviewed limitations of noninvasive testing and if any new or worsening symptoms should occur advised her to notify our office immediately for further evaluation. \par There is improvement in symptoms of fatigue with adjustment of synthroid to 122 mcg \par trial of Eliquis 2.5 mg 2x daily. instructed to call if symptoms of hematuria\par \par PAF. Fhx CVA. \par Previous episode of arrhythmias. Cont remote monitoring. \par Educated patient on pathophysiology of atrial fibrillation and natural progression as well as associated risk of cardioembolic event. Informed her/him on indications for long term anticoagulation. \par Will consult w/ PCP re hematuria, UA results, and plan for urology involvement. \par Monitor CBC. Reviewed bleeding precautions. \par \par MR/MVP \par Monitor degree of regurgitation, LV size and function. Planned echo in 2 months at outside office req a copy. \par Monitor VHD\par Does not require SBE prophylaxis.  \par \par HLD. Elevated Lp(a). \par Continue statin.  More aggressive LDL goals. \par \par Discontinue Xarelto due to symptoms of hematuria. \par Reviewed fall and bleeding precautions.  Monitor CBC and renal function. \par Discussed future left atrial appendage occlusion \par plan to involve urology. follow up after UA \par \par Outside EKG, device check, and echo requested \par will call with results \par \par Adjunctive lifestyle measures advised \par Low-sodium diet \par \par Discussed red flag symptoms that would warrant immediate intervention. All patient questions and concerns have been addressed. Instructed to call the office if any new symptoms. \par \par Encounter documented by Mone Alan on 12/06/2021 acting as a scribe for Dr. Santos Garcia MD [facility] DRISS

## 2021-12-10 NOTE — HISTORY OF PRESENT ILLNESS
[FreeTextEntry1] : AMAYA CARPENTER  is a 86 year old  F here to review cardiovascular test results. \par \par Difficult historian  Referred by primary physician.  \par Previously had cardiovascular care in [facility] with Dr. Melgoza.  Presents to establish local cardiovascular care.  She has more difficulty traveling to [place].  \par \par H/o syncope and heart block, underwent pacemaker in 2017.  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 Ventricular ectopy including PVCs and nonsustained ventricular tachycardia. \par Biotronik pacemaker by Modesto Grier at [facility].\par \par Has allergy to contrast including rash.  \par Family history of stroke. 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 \par \par Previously her device has been notable for transient atrial fibrillation.  Started on NOAC.  \par There was brief asx NSVT July '21 which prompted consult w/ our office\par \par There is no prior history of a clinical myocardial infarction, coronary revascularization. \par There is no history of symptomatic congestive heart failure\par \par Tries to remain active with walking.  \par She has since changed to Synthroid 112 mcg. reports feeling remarkably better. less fatigue \par At times she is aware of extra heartbeat.\par There is no exertional chest pain, pressure or discomfort. \par There are symptoms of dyspnea with exertion \par There is no symptoms lightheadedness, dizziness or syncope.\par she reports neuropathy left leg \par \par Developed hematuria while on eliquis 5mg BID. PCP had her stop it. UA reviewed \par She has bladder prolapse likely plan to involve urology. \par Trial of Eliquis 2.5 mg 2x daily. Instructed to call if any adverse events \par Was on Xarelto. Hematuria occurred. \par Has since stopped all anticoagulation therapy since late November 2021. No hematuria since cessation of medication. \par \par Last blood work March 2021 total cholesterol 151, LDL 82, creatinine 0.9, potassium 4.6, hemoglobin 13.5 \par Blood work August 2021.  Creatinine 1.0, TSH 0.2, hemoglobin 13.3  \par Nuclear stress test small mild to moderate basal inferior fixed defect, mild global hypokinesis with stress. No ischemia. \par Outside echocardiogram mild to moderate MR\par Outside EKG and device check and ZELDA to be requested

## 2021-12-22 ENCOUNTER — APPOINTMENT (OUTPATIENT)
Dept: SURGICAL ONCOLOGY | Facility: CLINIC | Age: 86
End: 2021-12-22
Payer: MEDICARE

## 2021-12-22 VITALS
WEIGHT: 138 LBS | RESPIRATION RATE: 16 BRPM | HEART RATE: 89 BPM | BODY MASS INDEX: 22.18 KG/M2 | TEMPERATURE: 97.2 F | DIASTOLIC BLOOD PRESSURE: 83 MMHG | OXYGEN SATURATION: 97 % | HEIGHT: 66 IN | SYSTOLIC BLOOD PRESSURE: 149 MMHG

## 2021-12-22 PROCEDURE — 99214 OFFICE O/P EST MOD 30 MIN: CPT

## 2022-03-14 ENCOUNTER — APPOINTMENT (OUTPATIENT)
Dept: CARDIOLOGY | Facility: CLINIC | Age: 87
End: 2022-03-14
Payer: MEDICARE

## 2022-03-14 VITALS
HEART RATE: 83 BPM | OXYGEN SATURATION: 100 % | BODY MASS INDEX: 22.5 KG/M2 | SYSTOLIC BLOOD PRESSURE: 136 MMHG | DIASTOLIC BLOOD PRESSURE: 78 MMHG | HEIGHT: 66 IN | TEMPERATURE: 97.1 F | WEIGHT: 140 LBS

## 2022-03-14 PROCEDURE — 99214 OFFICE O/P EST MOD 30 MIN: CPT

## 2022-03-14 RX ORDER — ASPIRIN ENTERIC COATED TABLETS 81 MG 81 MG/1
81 TABLET, DELAYED RELEASE ORAL DAILY
Refills: 0 | Status: DISCONTINUED | COMMUNITY

## 2022-03-14 NOTE — REVIEW OF SYSTEMS
[Dyspnea on exertion] : dyspnea during exertion [Myalgia] : myalgia [Negative] : Heme/Lymph [Blood In The Urine] : hematuria

## 2022-03-26 NOTE — HISTORY OF PRESENT ILLNESS
[FreeTextEntry1] : AMAYA CARPENTER  is a 86 year old  F \par \par Referred by primary physician.  \par Previously had cardiovascular care in [facility] with Dr. Melgoza.  Presents to establish local cardiovascular care.   \par \par H/o syncope and heart block, underwent pacemaker in 2017.  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 Ventricular ectopy including PVCs and nonsustained ventricular tachycardia. \par Biotronik pacemaker by Modesto Grier at [facility].\par \par Previously her device has been notable for transient atrial fibrillation.  Started on NOAC.  \par There was brief asx NSVT July '21 \par \par There is no prior history of a clinical myocardial infarction, coronary revascularization. \par There is no history of symptomatic congestive heart failure\par \par Tries to remain active with walking.  \par She has since changed to Synthroid 112 mcg. reports feeling remarkably better. less fatigue \par At times she is aware of extra heartbeat.\par There is no exertional chest pain, pressure or discomfort. \par There are symptoms of dyspnea with exertion \par There is no symptoms lightheadedness, dizziness or syncope.\par she reports neuropathy left leg \par \par She reports she continues to have difficulty with hematuria.  Remains off anticoagulation.  She underwent evaluation with Dr. Webster.  She has cystoscopy and ultrasound.  Both of these were unremarkable.  She now takes aspirin therapy.  \par \par Last blood work March 2021 total cholesterol 151, LDL 82, creatinine 0.9, potassium 4.6, hemoglobin 13.5 \par Blood work August 2021.  Creatinine 1.0, TSH 0.2, hemoglobin 13.3  \par Nuclear stress test small mild to moderate basal inferior fixed defect, mild global hypokinesis with stress. No ischemia. \par Outside echocardiogram mild to moderate MR\par Outside EKG and device check and ZELDA to be requested \par \par Has allergy to contrast including rash.  \par Family history of stroke. 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 \par

## 2022-03-26 NOTE — ASSESSMENT
[FreeTextEntry1] : H/o syncope and heart block, underwent pacemaker in 2017.  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 Ventricular ectopy including PVCs and nonsustained ventricular tachycardia. \par \par Allergy to contrast \par Reviewed limitations of noninvasive testing and if any new or worsening symptoms should occur advised her to notify our office immediately for further evaluation. \par There is improvement in symptoms of fatigue with adjustment of synthroid \par \par Would like to give another trial of anticoagulation.  \par There has been improvement in her symptoms with adjustment of her thyroid medication.  \par Instructed to notify our office if recurrent symptoms of hematuria.  \par If intolerant of anticoagulation discussed possible left atrial appendage occlusion.  \par Follow-up device check.  This is followed at [facility]. \par We received a transmission from last month last episode of atrial fibrillation was November 2021.  \par Follow-up blood work.  Follow-up echocardiogram.\par \par PAF. Fhx CVA. \par Previous episode of arrhythmias. Cont remote monitoring. \par Educated patient on pathophysiology of atrial fibrillation and natural progression as well as associated risk of cardioembolic event. Informed indications for long term anticoagulation. \par Reviewed fall and bleeding precautions.  Monitor CBC and renal function. \par \par MR/MVP \par Monitor degree of regurgitation, LV size and function. \par Monitor VHD\par Does not require SBE prophylaxis.  \par \par HLD. Elevated Lp(a). \par Continue statin.  More aggressive LDL goals. \par \par Adjunctive lifestyle measures advised \par Low-sodium diet \par \par Discussed red flag symptoms that would warrant immediate intervention. All patient questions and concerns have been addressed. Instructed to call the office if any new symptoms.

## 2022-03-28 ENCOUNTER — APPOINTMENT (OUTPATIENT)
Dept: CARDIOLOGY | Facility: CLINIC | Age: 87
End: 2022-03-28
Payer: MEDICARE

## 2022-03-28 PROCEDURE — 93306 TTE W/DOPPLER COMPLETE: CPT

## 2022-03-28 PROCEDURE — 93880 EXTRACRANIAL BILAT STUDY: CPT

## 2022-04-19 ENCOUNTER — APPOINTMENT (OUTPATIENT)
Dept: CARDIOLOGY | Facility: CLINIC | Age: 87
End: 2022-04-19

## 2022-04-27 ENCOUNTER — OUTPATIENT (OUTPATIENT)
Dept: OUTPATIENT SERVICES | Facility: HOSPITAL | Age: 87
LOS: 1 days | End: 2022-04-27
Payer: MEDICARE

## 2022-04-27 ENCOUNTER — APPOINTMENT (OUTPATIENT)
Dept: SURGICAL ONCOLOGY | Facility: CLINIC | Age: 87
End: 2022-04-27
Payer: MEDICARE

## 2022-04-27 ENCOUNTER — APPOINTMENT (OUTPATIENT)
Dept: ULTRASOUND IMAGING | Facility: IMAGING CENTER | Age: 87
End: 2022-04-27
Payer: MEDICARE

## 2022-04-27 VITALS
DIASTOLIC BLOOD PRESSURE: 84 MMHG | BODY MASS INDEX: 22.5 KG/M2 | HEIGHT: 66 IN | WEIGHT: 140 LBS | TEMPERATURE: 97.2 F | SYSTOLIC BLOOD PRESSURE: 144 MMHG | HEART RATE: 90 BPM | RESPIRATION RATE: 16 BRPM | OXYGEN SATURATION: 98 %

## 2022-04-27 DIAGNOSIS — Z95.0 PRESENCE OF CARDIAC PACEMAKER: Chronic | ICD-10-CM

## 2022-04-27 DIAGNOSIS — Z92.3 PERSONAL HISTORY OF IRRADIATION: Chronic | ICD-10-CM

## 2022-04-27 DIAGNOSIS — E89.0 POSTPROCEDURAL HYPOTHYROIDISM: Chronic | ICD-10-CM

## 2022-04-27 DIAGNOSIS — Z98.890 OTHER SPECIFIED POSTPROCEDURAL STATES: Chronic | ICD-10-CM

## 2022-04-27 DIAGNOSIS — Z98.49 CATARACT EXTRACTION STATUS, UNSPECIFIED EYE: Chronic | ICD-10-CM

## 2022-04-27 DIAGNOSIS — C43.39 MALIGNANT MELANOMA OF OTHER PARTS OF FACE: ICD-10-CM

## 2022-04-27 DIAGNOSIS — Z90.11 ACQUIRED ABSENCE OF RIGHT BREAST AND NIPPLE: Chronic | ICD-10-CM

## 2022-04-27 PROCEDURE — 76536 US EXAM OF HEAD AND NECK: CPT | Mod: 26

## 2022-04-27 PROCEDURE — 99214 OFFICE O/P EST MOD 30 MIN: CPT | Mod: 25

## 2022-04-27 PROCEDURE — 76536 US EXAM OF HEAD AND NECK: CPT

## 2022-04-27 PROCEDURE — 11440 EXC FACE-MM B9+MARG 0.5 CM/<: CPT

## 2022-04-27 NOTE — HISTORY OF PRESENT ILLNESS
[de-identified] : 87 y/o female presents for a melanoma follow up visit. 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  Plastic reconstruction was performed by Dr. Marisela Maxwell.  Final pathology was 1.2 mm melanoma with a mitotic rate of 1/mm^2, with no ulceration or regression. We decided to not proceed with a sentinel lymph node biopsy due to low risk for mitosis. \par \par LDH 21: 136\par \par Pathology (21):\par Left cheek, punch biopsy:\par -Melanoma, approximately 0.7 mm in thickness, mitotic rate 0/mm2\par -No ulceration and no regression changes\par -Tumor staging pT1a \par \par Final Pathology (20): \par 1 - Left cheek, wide excision:\par - Melanoma, 1.2 mm in thickness, mitotic rate 1/mm2\par - No ulceration and no regresssion changes\par - Tumor staging pT2a\par \par Dermatology report (20):\par Left Cheek, Punch Biopsy\par Malignant Melanoma.\par Breslow's Thickness: 0.7 mm\par Mitosis per 1mm2: None in dermal component\par Lymphoid infiltrate: 1+\par Ulceration: absent\par Regression: absent\par Margins: extends to both lateral margins\par Immunohistochemical stains: confirmed with Melan-A, MITF, HMB-45\par Stage: T1B\par \par Pt has a family history of skin cancer (brother) and melanoma (maternal uncle) -  from same. \par Patient was seen by her dentist for mildly prominent right cervical lymph node secondary to right broken tooth.

## 2022-04-27 NOTE — ADDENDUM
[FreeTextEntry1] : I, Bina Li, acted solely as a scribe for Dr. Jay Woodson on this date 12/22/2021.\par \par

## 2022-04-27 NOTE — HISTORY OF PRESENT ILLNESS
[de-identified] : 84 y/o female presents for a postop visit regarding malignant melanoma. Pt. notes pigmented lesion left cheek near mouth for the past 1-2 yrs.  Pt. 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 \par \par She is status post wide excision of left cheek melanoma on 20.  Plastic reconstruction was performed by Dr. Marisela Maxwell.  Final pathology was 1.2 mm melanoma with a mitotic rate of 1/mm^2, with no ulceration or regression.  Given the final pathology I discussed a sentinel lymph node biopsy given change in Breslow thickness.\par \par Was sentinel node biopsy done?  No op note.

## 2022-04-27 NOTE — ASSESSMENT
[FreeTextEntry1] : T2a left cheek melanoma-s/p wide excision 12/22/20\par LENCHO\par Continue dermatologic surveillance\par RTO 4 months

## 2022-04-27 NOTE — ASSESSMENT
[FreeTextEntry1] : Wide excision of T2 melanoma from the left cheek on 12/22/20- negative margins.\par Discussed return to the OR for sentinel biopsy however will hold off given low risk for metastatic disease along with patient's age\par RTO 3 months

## 2022-04-27 NOTE — PHYSICAL EXAM
[Normal] : supple, no neck mass and thyroid not enlarged [Normal Supraclavicular Lymph Nodes] : normal supraclavicular lymph nodes [Normal Groin Lymph Nodes] : normal groin lymph nodes [Normal Axillary Lymph Nodes] : normal axillary lymph nodes [Normal] : oriented to person, place and time, with appropriate affect [de-identified] : mild right level 2 cervical lymph node. us shows prominent subglandular node. no suspicious adenopathy.  [de-identified] : Left cheek reconstruction well-healed, no evidence of recurrence, no other suspicious skin lesions.

## 2022-04-27 NOTE — REASON FOR VISIT
[Post-Op] : a post-op for [FreeTextEntry2] : status post wide excision of left cheek melanoma on 12/22/20.

## 2022-04-30 NOTE — PHYSICAL EXAM
[Normal] : supple, no neck mass and thyroid not enlarged [Normal Supraclavicular Lymph Nodes] : normal supraclavicular lymph nodes [Normal Groin Lymph Nodes] : normal groin lymph nodes [Normal Axillary Lymph Nodes] : normal axillary lymph nodes [Normal] : oriented to person, place and time, with appropriate affect [de-identified] : mild right level 2 cervical lymph node. us shows prominent subglandular node. no suspicious adenopathy.  [de-identified] : reconstructed left cheek well-healed, no evidence of recurrence.

## 2022-04-30 NOTE — PROCEDURE
[FreeTextEntry1] : Skin punch biopsy of left temple pigmented lesion under sterile conditions using 1% lidocaine. \par 3 mm punch biopsy device used\par Incision closed with 4-0 Monocryl simple suture\par Pt tolerated procedure well

## 2022-04-30 NOTE — HISTORY OF PRESENT ILLNESS
[de-identified] : 85 y/o female presents for a melanoma follow up visit. 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  Plastic reconstruction was performed by Dr. Marisela Maxwell.  Final pathology was 1.2 mm melanoma with a mitotic rate of 1/mm^2, with no ulceration or regression. We decided to not proceed with a sentinel lymph node biopsy due to low risk for mitosis. \par \par LDH 21: 136\par \par Pathology (21):\par Left cheek, punch biopsy:\par -Melanoma, approximately 0.7 mm in thickness, mitotic rate 0/mm2\par -No ulceration and no regression changes\par -Tumor staging pT1a \par \par Final Pathology (20): \par 1 - Left cheek, wide excision:\par - Melanoma, 1.2 mm in thickness, mitotic rate 1/mm2\par - No ulceration and no regresssion changes\par - Tumor staging pT2a\par \par Dermatology report (20):\par Left Cheek, Punch Biopsy\par Malignant Melanoma.\par Breslow's Thickness: 0.7 mm\par Mitosis per 1mm2: None in dermal component\par Lymphoid infiltrate: 1+\par Ulceration: absent\par Regression: absent\par Margins: extends to both lateral margins\par Immunohistochemical stains: confirmed with Melan-A, MITF, HMB-45\par Stage: T1B\par \par Pt has a family history of skin cancer (brother) and melanoma (maternal uncle) -  from same. \par Patient was seen by her dentist for mildly prominent right cervical lymph node secondary to right broken tooth.

## 2022-04-30 NOTE — ASSESSMENT
[FreeTextEntry1] : T2a left cheek melanoma- LENCHO\par S/p punch biopsy lesion left temple \par Follow up with pathology \par Will get ultrasound of the neck today \par RTO 3 months \par \par \par

## 2022-04-30 NOTE — ADDENDUM
[FreeTextEntry1] : I, Bina Li, acted solely as a scribe for Dr. Jay Woodson on this date 04/27/2022.\par \par \par

## 2022-05-05 ENCOUNTER — TRANSCRIPTION ENCOUNTER (OUTPATIENT)
Age: 87
End: 2022-05-05

## 2022-05-06 ENCOUNTER — NON-APPOINTMENT (OUTPATIENT)
Age: 87
End: 2022-05-06

## 2022-05-09 ENCOUNTER — APPOINTMENT (OUTPATIENT)
Dept: CARDIOLOGY | Facility: CLINIC | Age: 87
End: 2022-05-09
Payer: MEDICARE

## 2022-05-09 VITALS
OXYGEN SATURATION: 100 % | BODY MASS INDEX: 22.18 KG/M2 | SYSTOLIC BLOOD PRESSURE: 142 MMHG | DIASTOLIC BLOOD PRESSURE: 80 MMHG | WEIGHT: 138 LBS | HEIGHT: 66 IN | HEART RATE: 103 BPM | TEMPERATURE: 99.1 F

## 2022-05-09 PROCEDURE — 99215 OFFICE O/P EST HI 40 MIN: CPT

## 2022-05-09 NOTE — ASSESSMENT
[FreeTextEntry1] : AMAYA CARPENTER is a 86 year old F who presents today May 09, 2022 with the above history and the following active issues: \par \par H/o syncope and heart block, underwent pacemaker in 2017.  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 Ventricular ectopy including PVCs and nonsustained ventricular tachycardia. \par \par Allergy to contrast \par Reviewed limitations of noninvasive testing and if any new or worsening symptoms should occur advised her to notify our office immediately for further evaluation. \par There is improvement in symptoms of fatigue with adjustment of synthroid \par \par PAF. Fhx CVA. \par <1% burden. Last 5/1/22. Office will review ? event on 4/6/22 with [facility] as I do not see it recorded. \par Tolerating eliquis 2.5mg BID. Dr Garcia spoke w/ Dr. Johnson and agree w/ use of 5mg BID given her weight > 60 kg. \par Instructed to notify our office if recurrent symptoms of hematuria.  \par If intolerant of anticoagulation discussed possible left atrial appendage occlusion.  \par Follow-up device check.  This is followed at [facility]. \par Reviewed fall and bleeding precautions.  Monitor CBC and renal function. 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 \par Note LAE, emphasized therapeutic AC. \par Surveillance monitoring will be performed\par Does not require SBE prophylaxis.  \par \par HLD. ? Elevated Lp(a) - do not see a record, will repeat in the future. \par Continue statin.  More aggressive LDL goals. Pt declines uptitration of atorva but will pursue diet and lifestyle modification measures \par \par Carotid atherosclerosis, mod likely overestimated with tortuosity. \par Surveillance monitoring \par Cont statin \par \par Discussed red flag symptoms that would warrant immediate intervention. All patient questions and concerns have been addressed. Instructed to call the office if any new symptoms. \par \par Sincerely,\par \par CAROLA Walsh\par Patients history, testing, and plan reviewed with supervising MD: Dr. Santos Garcia

## 2022-05-09 NOTE — HISTORY OF PRESENT ILLNESS
[FreeTextEntry1] : AMAYA CARPENTER  is a 86 year old  F \par \par Referred by primary physician.  \par Previously had cardiovascular care in [facility] with Dr. Melgoza.  Presents to establish local cardiovascular care.   \par \par H/o syncope and heart block, underwent pacemaker in 2017.  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 Ventricular ectopy including PVCs and nonsustained ventricular tachycardia. \par Biotronik pacemaker by Modesto Grier at [facility].\par There is ? elevated lipoprotein (a) however do not see this on our documentation\par \par Previously her device has been notable for transient atrial fibrillation.  Started on NOAC.  \par There was brief asx NSVT July '21 then again may '22 \par \par There is no prior history of a clinical myocardial infarction, coronary revascularization. \par There is no history of symptomatic congestive heart failure\par \par Tries to remain active with walking.  \par She has since changed to Synthroid 112 mcg. reports feeling remarkably better. less fatigue \par At times she is aware of extra heartbeat.\par There is no exertional chest pain, pressure or discomfort. \par There are symptoms of dyspnea with exertion \par There is no symptoms lightheadedness, dizziness or syncope.\par she reports neuropathy left leg \par \par She was off AC d/t hematuria.  She underwent evaluation with Dr. Webster.  She has cystoscopy and ultrasound.  Both of these were unremarkable. She was then restarted on eliquis 2.5mg BID and there has since been no recurrent bleeding. Hgb stable. \par \par She was called on 4/6/22 from device clinic for an event which I do not see recorded. There was PAF ~1h 4/30 - 5/1 pt was sleeping. Overall low burden and rates controlled. \par \par Labs 4/6/22 cr 0.98, k 4.4, hgb 14.1, A1c 5, LDL 84, TSH wnl\par Echo 2/28/22 EF 50-55%, mild to mod MR, severe LAE, normal PASP\par Carotid doppler moderate BL ICA 50-69% stenosis with tortuosity\par Last blood work March 2021 total cholesterol 151, LDL 82, creatinine 0.9, potassium 4.6, hemoglobin 13.5 \par Blood work August 2021.  Creatinine 1.0, TSH 0.2, hemoglobin 13.3  \par Nuclear stress test 2021 small mild to moderate basal inferior fixed defect, mild global hypokinesis with stress. No ischemia. \par Outside echocardiogram mild to moderate MR\par Outside EKG and device check and ZELDA to be requested \par \par Has allergy to contrast including rash.  \par Family history of stroke. 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 \par

## 2022-05-09 NOTE — REVIEW OF SYSTEMS
[Dyspnea on exertion] : dyspnea during exertion [Blood In The Urine] : hematuria [Myalgia] : myalgia [Negative] : Heme/Lymph

## 2022-05-09 NOTE — ADDENDUM
[FreeTextEntry1] : Please note the patient was seen and examined with NP Fartun King\aguila I was physically present during the service of the patient and personally examined the patient. \aguila I was directly involved in the management plan and recommendations of the care provided to the patient. \aguila I personally reviewed the history and physical examination as documented by the NP above.\par 05/09/2022\par

## 2022-06-03 ENCOUNTER — NON-APPOINTMENT (OUTPATIENT)
Age: 87
End: 2022-06-03

## 2022-06-03 LAB — CORE LAB BIOPSY: NORMAL

## 2022-08-29 ENCOUNTER — APPOINTMENT (OUTPATIENT)
Dept: CARDIOLOGY | Facility: CLINIC | Age: 87
End: 2022-08-29

## 2022-08-29 VITALS
DIASTOLIC BLOOD PRESSURE: 86 MMHG | OXYGEN SATURATION: 98 % | SYSTOLIC BLOOD PRESSURE: 138 MMHG | WEIGHT: 145 LBS | TEMPERATURE: 97.3 F | BODY MASS INDEX: 23.3 KG/M2 | HEART RATE: 76 BPM | HEIGHT: 66 IN

## 2022-08-29 PROCEDURE — 99214 OFFICE O/P EST MOD 30 MIN: CPT

## 2022-09-02 ENCOUNTER — APPOINTMENT (OUTPATIENT)
Dept: SURGICAL ONCOLOGY | Facility: CLINIC | Age: 87
End: 2022-09-02

## 2022-09-02 VITALS
SYSTOLIC BLOOD PRESSURE: 129 MMHG | WEIGHT: 140 LBS | RESPIRATION RATE: 15 BRPM | TEMPERATURE: 97.1 F | OXYGEN SATURATION: 97 % | HEIGHT: 66 IN | HEART RATE: 86 BPM | DIASTOLIC BLOOD PRESSURE: 78 MMHG | BODY MASS INDEX: 22.5 KG/M2

## 2022-09-02 DIAGNOSIS — C43.39 MALIGNANT MELANOMA OF OTHER PARTS OF FACE: ICD-10-CM

## 2022-09-02 PROCEDURE — 99214 OFFICE O/P EST MOD 30 MIN: CPT

## 2022-09-02 NOTE — PHYSICAL EXAM
[Normal] : supple, no neck mass and thyroid not enlarged [Normal Supraclavicular Lymph Nodes] : normal supraclavicular lymph nodes [Normal Groin Lymph Nodes] : normal groin lymph nodes [Normal Axillary Lymph Nodes] : normal axillary lymph nodes [Normal] : oriented to person, place and time, with appropriate affect [de-identified] : 3 mm left posterior cervical lymph node level 5.  us shows prominent subglandular node. no suspicious adenopathy.  [de-identified] : left cheek reconstruction well healed. no evidence of recurrence. Multiple benign-appearing pigmented lesions head, neck, trunk, extremities.

## 2022-09-02 NOTE — ADDENDUM
[FreeTextEntry1] : I, Bina Li, acted solely as a scribe for Dr. Jay Woodson on this date 09/02/2022.\par \par \par \par

## 2022-09-02 NOTE — HISTORY OF PRESENT ILLNESS
[de-identified] : 86 y/o female presents for a follow up visit. 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  Plastic reconstruction was performed by Dr. Marisela Maxwell.  Final pathology was 1.2 mm melanoma with a mitotic rate of 1/mm^2, with no ulceration or regression. We decided to not proceed with a sentinel lymph node biopsy due to low risk for mitosis. \par \par Tissue biopsy (22):\par Left temple,  punch biopsy\par - Actinic keratosis, early,\par \par US soft tissue head/neck (22): There is a 10 x 3 x 7 mm superficial lymph node in the left level 5 region at the palpable abnormality. The lymph node has benign morphology with fatty hilum. No other lymph nodes are visualized.\par \par LDH 21: 136\par \par Pathology (21):\par Left cheek, punch biopsy:\par -Melanoma, approximately 0.7 mm in thickness, mitotic rate 0/mm2\par -No ulceration and no regression changes\par -Tumor staging pT1a \par \par Final Pathology (20): \par 1 - Left cheek, wide excision:\par - Melanoma, 1.2 mm in thickness, mitotic rate 1/mm2\par - No ulceration and no regresssion changes\par - Tumor staging pT2a\par \par Dermatology report (20):\par Left Cheek, Punch Biopsy\par Malignant Melanoma.\par Breslow's Thickness: 0.7 mm\par Mitosis per 1mm2: None in dermal component\par Lymphoid infiltrate: 1+\par Ulceration: absent\par Regression: absent\par Margins: extends to both lateral margins\par Immunohistochemical stains: confirmed with Melan-A, MITF, HMB-45\par Stage: T1B\par \par Pt has a family history of skin cancer (brother) and melanoma (maternal uncle) -  from same. \par Patient was seen by her dentist for mildly prominent right cervical lymph node secondary to right broken tooth.

## 2022-09-02 NOTE — ASSESSMENT
[FreeTextEntry1] : T2a left cheek melanoma- LENCHO\par Continue dermatologic surveillance \par Continue follow up with ophthalmology\par RTO 6 months \par \par \par

## 2022-09-22 NOTE — HISTORY OF PRESENT ILLNESS
[FreeTextEntry1] : AMAYA CARPENTER  is a 87 year old  F\par \par Referred by primary physician.  \par Previously had cardiovascular care in [facility] with Dr. Melgoza.  \par Presents to establish local cardiovascular care.   \par \par H/o syncope and heart block, underwent pacemaker in 2017.  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 Ventricular ectopy including PVCs and nonsustained ventricular tachycardia. 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  She is now on therapeutic anticoagulation. \par \par She was off AC d/t hematuria.  She underwent evaluation with Dr. Dominique.  She has cystoscopy and ultrasound.  Both of these were unremarkable. She was then restarted on eliquis 2.5mg BID and there has since been no recurrent bleeding. Hgb stable. \par \par There is no prior history of a clinical myocardial infarction, coronary revascularization. \par There is no history of symptomatic congestive heart failure\par \par Tries to remain active with walking.  \par She has since changed to Synthroid 112 mcg. reports feeling remarkably better. less fatigue \par At times she is aware of extra heartbeat.\par There is no exertional chest pain, pressure or discomfort. \par There is no symptoms lightheadedness, dizziness or syncope.\par neuropathy left leg \par \par She provides outside blood work which is notable for an elevated LP(a).  \par \par Last device check was within normal limits.  \par Prior device checks have demonstrated transient atrial fibrillation.\par Labs 4/6/22 cr 0.98, k 4.4, hgb 14.1, A1c 5, LDL 84, TSH wnl\par Echo 2/28/22 EF 50-55%, mild to mod MR, severe LAE, normal PASP\par Carotid doppler moderate BL ICA 50-69% stenosis with tortuosity\par Nuclear stress test 2021 small mild to moderate basal inferior fixed defect, mild global hypokinesis with stress. No ischemia. \par \par Has allergy to contrast including rash.  \par Family history of stroke. 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

## 2022-09-22 NOTE — ASSESSMENT
[FreeTextEntry1] : I discussed future LP(a) therapy lowering therapies.  Increase statin therapy.  \par Monitor thyroid replacement. \par \par H/o syncope and heart block, underwent pacemaker in 2017.  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 Ventricular ectopy including PVCs and nonsustained ventricular tachycardia. \par \par Allergy to contrast \par Reviewed limitations of noninvasive testing and if any new or worsening symptoms should occur advised her to notify our office immediately for further evaluation. \par There is improvement in symptoms of fatigue with adjustment of synthroid \par \par PAF. Fhx CVA. \par <1% burden\par Back on NOAC\par Instructed to notify our office if recurrent symptoms of hematuria.  \par If intolerant of anticoagulation discussed possible left atrial appendage occlusion.  \par Follow-up device check.  This is followed at [facility]. \par Reviewed fall and bleeding precautions.  Monitor CBC and renal function. \par \par MR/MVP \par mild to mod regurgitation, normal LV size and function. \par Note LAE, emphasized therapeutic AC. \par Surveillance monitoring \par Does not require SBE prophylaxis.  \par \par HLD. Elevated Lp(a)  \par Continue statin.  \par diet and lifestyle modification measures \par \par Carotid atherosclerosis, mod likely overestimated with tortuosity. \par Surveillance monitoring \par Cont statin \par \par Discussed red flag symptoms that would warrant immediate intervention. All patient questions and concerns have been addressed. Instructed to call the office if any new symptoms. \par

## 2022-09-26 ENCOUNTER — NON-APPOINTMENT (OUTPATIENT)
Age: 87
End: 2022-09-26

## 2022-10-31 ENCOUNTER — APPOINTMENT (OUTPATIENT)
Dept: ORTHOPEDIC SURGERY | Facility: CLINIC | Age: 87
End: 2022-10-31

## 2022-10-31 DIAGNOSIS — S76.312A STRAIN OF MUSCLE, FASCIA AND TENDON OF THE POSTERIOR MUSCLE GROUP AT THIGH LEVEL, LEFT THIGH, INITIAL ENCOUNTER: ICD-10-CM

## 2022-10-31 PROCEDURE — 99204 OFFICE O/P NEW MOD 45 MIN: CPT

## 2022-10-31 PROCEDURE — 73562 X-RAY EXAM OF KNEE 3: CPT | Mod: LT

## 2022-10-31 NOTE — PHYSICAL EXAM
[4___] : hamstring 4[unfilled]/5 [] : patient ambulates without assistive device [Left] : left knee [Lateral] : lateral [[place]] : skyline [AP Standing] : anteroposterior standing [There are no fractures, subluxations or dislocations. No significant abnormalities are seen] : There are no fractures, subluxations or dislocations. No significant abnormalities are seen [Moderate tricompartmental OA lateral narrowing] : Moderate tricompartmental OA lateral narrowing [FreeTextEntry8] : hamstring insertion [TWNoteComboBox7] : flexion 120 degrees [de-identified] : extension 3 degrees

## 2022-10-31 NOTE — HISTORY OF PRESENT ILLNESS
[de-identified] : Patient reports pain in the medial knee that began about 3 months ago while she was stretching for exercise. She notes a history of non-surgical  ACL rupture in that knee several decades ago from a skiing accident. She also notes balance issues which she correlates to a brain surgical procedure. She is seeing Dr Lacey for shoe inserts and balance. She has been using ice, massage and Tylenol for the knee pain

## 2022-12-12 ENCOUNTER — APPOINTMENT (OUTPATIENT)
Dept: ORTHOPEDIC SURGERY | Facility: CLINIC | Age: 87
End: 2022-12-12

## 2022-12-12 DIAGNOSIS — M17.12 UNILATERAL PRIMARY OSTEOARTHRITIS, LEFT KNEE: ICD-10-CM

## 2022-12-12 PROCEDURE — 99213 OFFICE O/P EST LOW 20 MIN: CPT

## 2022-12-12 NOTE — PHYSICAL EXAM
[4___] : hamstring 4[unfilled]/5 [Left] : left knee [Lateral] : lateral [[place]] : skyline [AP Standing] : anteroposterior standing [There are no fractures, subluxations or dislocations. No significant abnormalities are seen] : There are no fractures, subluxations or dislocations. No significant abnormalities are seen [Moderate tricompartmental OA lateral narrowing] : Moderate tricompartmental OA lateral narrowing [] : no extensor lag [FreeTextEntry8] : hamstring insertion [TWNoteComboBox7] : flexion 120 degrees [de-identified] : extension 3 degrees

## 2022-12-12 NOTE — PHYSICAL EXAM
[4___] : hamstring 4[unfilled]/5 [Left] : left knee [Lateral] : lateral [[place]] : skyline [AP Standing] : anteroposterior standing [There are no fractures, subluxations or dislocations. No significant abnormalities are seen] : There are no fractures, subluxations or dislocations. No significant abnormalities are seen [Moderate tricompartmental OA lateral narrowing] : Moderate tricompartmental OA lateral narrowing [] : no extensor lag [FreeTextEntry8] : hamstring insertion [TWNoteComboBox7] : flexion 120 degrees [de-identified] : extension 3 degrees

## 2023-01-23 ENCOUNTER — APPOINTMENT (OUTPATIENT)
Dept: ORTHOPEDIC SURGERY | Facility: CLINIC | Age: 88
End: 2023-01-23

## 2023-03-05 ENCOUNTER — RESULT CHARGE (OUTPATIENT)
Age: 88
End: 2023-03-05

## 2023-03-06 ENCOUNTER — APPOINTMENT (OUTPATIENT)
Dept: CARDIOLOGY | Facility: CLINIC | Age: 88
End: 2023-03-06
Payer: MEDICARE

## 2023-03-06 ENCOUNTER — NON-APPOINTMENT (OUTPATIENT)
Age: 88
End: 2023-03-06

## 2023-03-06 VITALS
HEART RATE: 84 BPM | DIASTOLIC BLOOD PRESSURE: 84 MMHG | TEMPERATURE: 97.1 F | BODY MASS INDEX: 22.66 KG/M2 | OXYGEN SATURATION: 97 % | WEIGHT: 141 LBS | SYSTOLIC BLOOD PRESSURE: 146 MMHG | HEIGHT: 66 IN

## 2023-03-06 DIAGNOSIS — Z00.00 ENCOUNTER FOR GENERAL ADULT MEDICAL EXAMINATION W/OUT ABNORMAL FINDINGS: ICD-10-CM

## 2023-03-06 PROCEDURE — 93000 ELECTROCARDIOGRAM COMPLETE: CPT

## 2023-03-06 PROCEDURE — 99214 OFFICE O/P EST MOD 30 MIN: CPT | Mod: 25

## 2023-03-08 ENCOUNTER — APPOINTMENT (OUTPATIENT)
Dept: SURGICAL ONCOLOGY | Facility: CLINIC | Age: 88
End: 2023-03-08

## 2023-03-08 NOTE — HISTORY OF PRESENT ILLNESS
[FreeTextEntry1] : AMAYA CARPENTER  is a 87 year old  F\par \par Referred by primary physician.  \par Previously had cardiovascular care in [facility] with Dr. Melgoza.  \par Presents to establish local cardiovascular care.   \par \par H/o syncope and heart block, underwent pacemaker in 2017.  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 Ventricular ectopy including PVCs and nonsustained ventricular tachycardia. \par Biotronik pacemaker by Modesto Grier at [facility]. She cont remote monitoring through their clinic. 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 \par There is no history of symptomatic congestive heart failure\par \par There was brief asx NSVT July '21 then again may '22 \par Her outside device check has demonstrated atrial fibrillation.  She is now on therapeutic anticoagulation. \par \par She was off AC d/t hematuria.  She underwent evaluation with Dr. Dominique.  She has cystoscopy and ultrasound.  Both of these were unremarkable. She was then restarted on eliquis 2.5mg BID and there has since been no recurrent bleeding. Hgb stable. \par \par She has since changed to Synthroid 112 mcg. reports feeling remarkably better. less fatigue \par At times she is aware of extra heartbeat.\par Interim saw urology no further hematuria back on OAC but told of microscopic hematuria. Uro reportedly saw stones and possible cysts on CT imaging and now planned for cystoscopy on 3/14/23 \par \par Tries to remain active with walking/biking.  She has had increased family stress recently and feels occasionally short winded with her activities. \par There is no exertional chest pain, pressure or discomfort. \par There is no symptoms lightheadedness, dizziness or syncope.\par neuropathy left leg \par unable to perform CT with contrast d/t contrast allergy and also adverse rxn to prednisone \par \par EKG 3/6/23 V-paced \par Lab 2/2023 LDL 84 on atorva 20mg daily \par Labs 4/6/22 cr 0.98, k 4.4, hgb 14.1, A1c 5, LDL 84, TSH wnl\par Echo 2/28/22 EF 50-55%, mild to mod MR, severe LAE, normal PASP\par Carotid doppler moderate BL ICA 50-69% stenosis with tortuosity\par Nuclear stress test 2021 small mild to moderate basal inferior fixed defect, mild global hypokinesis with stress. No ischemia. \par \par Has allergy to contrast including rash.  \par Family history of stroke. 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

## 2023-03-08 NOTE — ASSESSMENT
[FreeTextEntry1] : AMAYA CARPENTER is a 87 year old F who presents today Mar 06, 2023 with the above history and the following active issues: \par \par Preoperative cardiovascular examination, cystoscopy\par At present, there are no active cardiac conditions. \par No recent unstable coronary syndromes, decompensated heart failure, severe valvular heart disease or significant dysrhythmias.  \par Baseline functional status is acceptable.    \par The clinical benefit of the proposed procedure outweighs the associated cardiovascular risk.  \par Risk not attenuated with further CV testing.  \par Prior testing as outlined above.\par Optimized from a cardiovascular perspective.\par For surgery and invasive procedures, recommend to discontinue apixaban at least 48 hours prior to elective surgery or invasive procedures with a moderate-to-high risk of clinically significant bleeding. Anticoagulation bridging during the 48 hours apixaban is interrupted and prior to the intervention is not generally required. Reinitiate apixaban when adequate hemostasis has been achieved.  If oral therapy cannot be administered, then consider administration of a parenteral anticoagulant. Note excess discontinuation of any oral anticoagulant, including apixaban, increases the risk of thrombotic events. Patient was counseled and verbalizes understanding of these associated risks. \par \par VARGAS \par Pt still maintains good functional status >4 METS without exertional complaints \par unable to perform CT with contrast d/t contrast allergy and also adverse rxn to prednisone \par repeat nuclear stress test to compare and ensure no high risk features \par Obtain 2d echocardiogram to evaluate resting heart structure, valvular function, and LVEF. \par \par HLD\par LDL resistant to current statin rx\par I discussed future LP(a) therapy lowering therapies.  Increase statin therapy to atorva 40mg daily. \par Eventual combination rx likely add zetia \par Monitor thyroid replacement. \par \par H/o syncope and heart block, underwent pacemaker in 2017.  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 Ventricular ectopy including PVCs and nonsustained ventricular tachycardia. \par There is improvement in symptoms of fatigue with adjustment of synthroid \par \par PAF. Fhx CVA. \par <1% burden\par Back on OAC\par If intolerant of anticoagulation discussed possible left atrial appendage occlusion.  \par Follow-up device check.  This is followed at [facility]. \par Reviewed fall and bleeding precautions.  Monitor CBC and renal function. \par \par MR/MVP \par mild to mod regurgitation, normal LV size and function. \par Note LAE, emphasized therapeutic AC. \par Surveillance monitoring \par Does not require SBE prophylaxis.  \par \par Carotid atherosclerosis, mod likely overestimated with tortuosity. \par Surveillance monitoring \par Cont statin \par \par F/U planned after testing \par Discussed red flag symptoms that would warrant immediate intervention. All patient questions and concerns have been addressed. Instructed to call the office if any new symptoms. \par \par Sincerely,\par \par CAROLA Garcia\par Patients history, testing, and plan reviewed with supervising MD: Dr. Santos Garcia

## 2023-03-08 NOTE — REVIEW OF SYSTEMS
[Blurry Vision] : blurred vision [Under Stress] : under stress [Negative] : Heme/Lymph [Dyspnea on exertion] : dyspnea during exertion

## 2023-04-03 ENCOUNTER — APPOINTMENT (OUTPATIENT)
Dept: CARDIOLOGY | Facility: CLINIC | Age: 88
End: 2023-04-03

## 2023-04-04 ENCOUNTER — APPOINTMENT (OUTPATIENT)
Dept: CARDIOLOGY | Facility: CLINIC | Age: 88
End: 2023-04-04

## 2023-04-05 ENCOUNTER — APPOINTMENT (OUTPATIENT)
Dept: CARDIOLOGY | Facility: CLINIC | Age: 88
End: 2023-04-05

## 2023-04-26 ENCOUNTER — APPOINTMENT (OUTPATIENT)
Dept: CARDIOLOGY | Facility: CLINIC | Age: 88
End: 2023-04-26
Payer: MEDICARE

## 2023-04-26 PROCEDURE — 93880 EXTRACRANIAL BILAT STUDY: CPT

## 2023-04-26 PROCEDURE — 93306 TTE W/DOPPLER COMPLETE: CPT

## 2023-04-26 PROCEDURE — 93979 VASCULAR STUDY: CPT

## 2023-05-02 ENCOUNTER — APPOINTMENT (OUTPATIENT)
Dept: CARDIOLOGY | Facility: CLINIC | Age: 88
End: 2023-05-02
Payer: MEDICARE

## 2023-05-02 PROCEDURE — 78452 HT MUSCLE IMAGE SPECT MULT: CPT

## 2023-05-02 PROCEDURE — A9502: CPT

## 2023-05-02 PROCEDURE — 93015 CV STRESS TEST SUPVJ I&R: CPT

## 2023-05-08 ENCOUNTER — APPOINTMENT (OUTPATIENT)
Dept: CARDIOLOGY | Facility: CLINIC | Age: 88
End: 2023-05-08
Payer: MEDICARE

## 2023-05-08 VITALS
OXYGEN SATURATION: 98 % | HEIGHT: 66 IN | DIASTOLIC BLOOD PRESSURE: 64 MMHG | BODY MASS INDEX: 22.18 KG/M2 | HEART RATE: 82 BPM | SYSTOLIC BLOOD PRESSURE: 112 MMHG | WEIGHT: 138 LBS

## 2023-05-08 DIAGNOSIS — I65.23 OCCLUSION AND STENOSIS OF BILATERAL CAROTID ARTERIES: ICD-10-CM

## 2023-05-08 PROCEDURE — 99215 OFFICE O/P EST HI 40 MIN: CPT

## 2023-05-09 ENCOUNTER — NON-APPOINTMENT (OUTPATIENT)
Age: 88
End: 2023-05-09

## 2023-05-09 PROBLEM — I65.23 ARTERIOSCLEROSIS OF BOTH CAROTID ARTERIES: Status: ACTIVE | Noted: 2022-05-09

## 2023-05-09 NOTE — HISTORY OF PRESENT ILLNESS
[FreeTextEntry1] : AMAYA CARPENTER is a 87 year old female with a past medical history of:\par \par H/o syncope and heart block, underwent pacemaker in 2017. 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 Ventricular ectopy including PVCs and nonsustained ventricular tachycardia. \par Biotronik pacemaker by Modesto Grier at [facility]. She cont remote monitoring through their clinic. 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 She is now on therapeutic anticoagulation. \par She was off AC d/t hematuria. She underwent evaluation with Dr. Dominique. She has cystoscopy and ultrasound. Both of these were unremarkable. She was then restarted on eliquis 2.5mg BID and there has since been no recurrent bleeding. Hgb stable.\par Saw urology no further hematuria back on OAC but told of microscopic hematuria. Uro reportedly saw stones and possible cysts on CT imaging and now planned for cystoscopy on 3/14/23\par \par unable to perform CT with contrast d/t contrast allergy and also adverse rxn to prednisone \par \par Last seen 3/6/23. Interim had cystoscopy with Dr. Dominique. Report requested. Nuclear stress testing, echo, carotid, and abd ordered at last OV. See details below. Statin was increased to 40mg daily. Reports episodes of vertigo. Had Eply treatment Friday with some temporary relief. There is SOB with exertion. There is trace edema. Denies CP, palpitations, syncope, and claudication. Former smoker. Exercises on recumbant bike and walks.\par \par BP on my exam 134/72.\par \par In addition. She has 3 recent tick bites. Two she removed herself, 1 I removed for her today in the office. Rash noted around 2 of the bites.\par \par Testing:\par \par Labs 3/29/23: Chol 139, HDL 60, Trigs 63, LDL 65, Cr 0.92, Na 141, K 5.1, Ca 9.5, AST 17, ALT 14, CK 43\par \par Nuke 5/2/23: Conclusions:\par 1. The patient underwent stress testing using pharmacological IV regadenoson (bolus injection, 400mcg\par over 10 to 20 seconds followed by 5ml flush) protocol.\par • The total procedure time was 4 minutes .\par • The peak heart rate was 129 bpm: 98 % of predicted maximal heart rate for this patient.\par 2. Non-diagnostic for ischemia due to paced rhythm.\par 3. Qualitative Perfusion:\par - small-sized, mild defect(s) in the mid anteroseptal wall that is fixed suggestive of an infarct.\par 4. The left ventricle is mildly decreased in function and mildly enlarged in size.\par 5. The resting left ventricular EF% is 46 %.\par 6. Hypokinesis of the septal wall(s).\par 7. Myocardial Perfusion: Abnormal.\par \par Echo 4/26/23: CONCLUSIONS:\par 1. The left ventricular systolic function is moderately decreased with an ejection fraction visually\par estimated at 40-45 %. Abnormal septal motion consistent with right ventricular pacemaker.\par 2. The basal inferior and basal inferolateral segments are hypokinetic.\par 3. There is mild (grade 1) left ventricular diastolic dysfunction, with elevated left atrial filling pressure.\par 4. Mild to moderate left ventricular hypertrophy.\par 5. Device lead is visualized in the right heart.\par 6. Mild aortic stenosis. Mild aortic regurgitation.\par 7. Mild mitral valve stenosis. Mild to moderate mitral regurgitation. MV RF = 41%.\par 8. Mild tricuspid regurgitation.\par 9. Trace pulmonic regurgitation.\par 10. Compared to the transthoracic echocardiogram performed on 3/28/2022 Mild AS and Mild MS are\par noted.\par \par Carotid u/s 4/26/23: CONCLUSIONS:\par 1. Mild non obstructive atherosclerosis noted bilaterally.\par 2. Vertebral arteries: antegrade flow bilaterally.\par 3. Compared to prior done on 3/28/2022, decrease in elevated velocities and ratios. Tortuousity of distal\par ICAs likely contribute to varying results.\par \par Abd u/s 4/26/23: CONCLUSIONS:\par 1. No evidence of abdominal aortic aneurysm.\par 2. Moderate hertogenous atherosclerosis noted throughout abdominal aorta.\par 3. No prior exam is available for comparison.\par \par EKG 3/6/23 V-paced \par \par Labs 12/9/22: Chol 158, HDL 55, Trigs 93, LDL 84, AST 17, ALT 12. \par \par Echo 2/28/22 EF 50-55%, mild to mod MR, severe LAE, normal PASP\par \par Carotid doppler 3/28/22moderate BL ICA 50-69% stenosis with tortuosity\par \par Nuclear stress test 2021 small mild to moderate basal inferior fixed defect, mild global hypokinesis with stress. No ischemia. \par \par Has allergy to contrast including rash. \par Family history of stroke. 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

## 2023-05-09 NOTE — PHYSICAL EXAM
[Well Developed] : well developed [Well Nourished] : well nourished [No Acute Distress] : no acute distress [Normal Conjunctiva] : normal conjunctiva [Normal Venous Pressure] : normal venous pressure [No Carotid Bruit] : no carotid bruit [Normal S1, S2] : normal S1, S2 [No Murmur] : no murmur [No Rub] : no rub [No Gallop] : no gallop [Clear Lung Fields] : clear lung fields [Good Air Entry] : good air entry [No Respiratory Distress] : no respiratory distress  [Soft] : abdomen soft [Non Tender] : non-tender [No Masses/organomegaly] : no masses/organomegaly [Normal Bowel Sounds] : normal bowel sounds [Normal Gait] : normal gait [No Edema] : no edema [No Cyanosis] : no cyanosis [No Clubbing] : no clubbing [No Varicosities] : no varicosities [No Rash] : no rash [No Skin Lesions] : no skin lesions [Moves all extremities] : moves all extremities [No Focal Deficits] : no focal deficits [Normal Speech] : normal speech [Alert and Oriented] : alert and oriented [Normal memory] : normal memory [de-identified] : Trace BLLE edema [de-identified] : see HPI

## 2023-05-09 NOTE — DISCUSSION/SUMMARY
[FreeTextEntry1] : AMAYA CARPENTER is a 87 year old F who presents today May 08, 2023 with the above history and the following active issues:\par \par Abn nuclear stress test 5/20223 and abnormal echo 4/26/23. See details above. SOB noted. D/w with Dr. Garcia. Recommend cardiac cath. I discussed this with patient. She at first said she wants to "try diet and exercise for 3 months". I educated her that we would advise to avoid rigorous activity at present given recent abnormal testing. She wants to discuss the need for cath with Dr. Garcia. Task send to [facility] to fit patient in sometime in the next week or two. Red flag symptoms reviewed which would warrant emergent evaluation. Patient verbalized an understanding. \par \par HLD: On statin. LDL 65 on most recent labs 3/2023.\par \par Advised to f/u with PCP re: multiple tick bites. She has been advised to go there today.\par \par H/o syncope and heart block, underwent pacemaker in 2017. 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 Ventricular ectopy including PVCs and nonsustained ventricular tachycardia. \par There is improvement in symptoms of fatigue with adjustment of synthroid \par \par PAF. Fhx CVA. \par <1% burden\par Back on OAC\par If intolerant of anticoagulation discussed possible left atrial appendage occlusion. \par Follow-up device check. This is followed at [place]. \par Reviewed fall and bleeding precautions. Monitor CBC and renal function. \par \par MR/MVP \par mild to mod regurgitation, normal LV size and function. \par Note LAE, emphasized therapeutic AC. \par Surveillance monitoring \par Does not require SBE prophylaxis. \par \par Carotid atherosclerosis, mod likely overestimated with tortuosity. \par Surveillance monitoring \par Cont statin\par \par Ongoing f/u with PCP.\par \par F/U as above.\par Discussed red flag symptoms, which would warrant sooner or emergent medical evaluation.\par Any questions and concerns were addressed and resolved.\par \par Sincerely,\par Alesha Arnold FN-BC\par Patient's history, testing, and plan was reviewed with supervising physician, Dr. Patrick Valentino\par

## 2023-05-09 NOTE — REVIEW OF SYSTEMS
[Negative] : Heme/Lymph [Dyspnea on exertion] : dyspnea during exertion [Lower Ext Edema] : lower extremity edema [FreeTextEntry5] : SOB/vertigo/edema per HPI

## 2023-05-17 ENCOUNTER — NON-APPOINTMENT (OUTPATIENT)
Age: 88
End: 2023-05-17

## 2023-05-22 ENCOUNTER — APPOINTMENT (OUTPATIENT)
Dept: CARDIOLOGY | Facility: CLINIC | Age: 88
End: 2023-05-22
Payer: MEDICARE

## 2023-05-22 VITALS
WEIGHT: 138 LBS | HEIGHT: 66 IN | DIASTOLIC BLOOD PRESSURE: 74 MMHG | OXYGEN SATURATION: 98 % | BODY MASS INDEX: 22.18 KG/M2 | SYSTOLIC BLOOD PRESSURE: 128 MMHG | HEART RATE: 91 BPM

## 2023-05-22 DIAGNOSIS — R06.09 OTHER FORMS OF DYSPNEA: ICD-10-CM

## 2023-05-22 PROCEDURE — 93280 PM DEVICE PROGR EVAL DUAL: CPT

## 2023-05-22 PROCEDURE — 99215 OFFICE O/P EST HI 40 MIN: CPT | Mod: 25

## 2023-05-22 NOTE — PROCEDURE
[Pacemaker] : pacemaker [Lead Imp:  ___ohms] : lead impedance was [unfilled] ohms [Sensing Amplitude ___mv] : sensing amplitude was [unfilled] mv [___V @] : [unfilled] V [___ ms] : [unfilled] ms [de-identified] : [employer] [de-identified] : Sam [de-identified] : [id number] [de-identified] : 06/05/2017 [de-identified] : 50-130bpm [de-identified] : 6year 9mos [de-identified] : patient with OV with SD today\par \par PAF on AC\par Vpaced 100%\par \par Patient will notify the office as to how she wants to proceed with future dvc checks \par \par Sincerely,\par \par Pily Morrison PA-C\par Patients history, testing and plan reviewed with supervising MD: Dr. Santos Garcia

## 2023-05-22 NOTE — ADDENDUM
[FreeTextEntry1] : Please note the patient was reviewed with advanced practice provider\par I was physically present during the service of the patient\par I was directly involved in the management plan and recommendations of care provided to the patient. \par 05/22/2023\par

## 2023-05-26 ENCOUNTER — APPOINTMENT (OUTPATIENT)
Dept: CARDIOLOGY | Facility: CLINIC | Age: 88
End: 2023-05-26
Payer: MEDICARE

## 2023-05-28 NOTE — HISTORY OF PRESENT ILLNESS
[FreeTextEntry1] : AMAYA CARPENTER  is a 88 year old  F\par \par H/o syncope and heart block, underwent pacemaker in 2017. 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 Ventricular ectopy including PVCs and nonsustained ventricular tachycardia. \par Biotronik pacemaker by Modesto Grier at [facility]. She cont remote monitoring through their clinic. 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 She is now on therapeutic anticoagulation. \par \par She was off AC d/t hematuria. She underwent evaluation with Dr. Dominique. She has cystoscopy and ultrasound. Both of these were unremarkable. \par She was then restarted on a/c, no recurrent bleeding. Hgb stable.\par She has had a history of uro/nephrolithiasis.\par \par unable to perform CT with contrast d/t contrast allergy and also adverse rxn to prednisone \par Reports episodes of vertigo. Had Eply treatment Friday with some temporary relief.\par \par Physical activity is limited.  There is dyspnea on exertion.  No chest discomfort.  \par \par No prior history of a thromboembolic event.  \par   \par A remote CTA was notable for coronary artery calcification.  \par \par Testing:\par \par Labs 3/29/23: Chol 139, HDL 60, Trigs 63, LDL 65, Cr 0.92, Na 141, K 5.1, Ca 9.5, AST 17, ALT 14, CK 43\par \par [facility] 5/2/23: Conclusions:\par small-sized, mild defect(s) in the mid anteroseptal wall that is fixed suggestive of an infarct.\par left ventricular EF% is 46 %.\par Hypokinesis of the septal wall(s).\par \par Echo 4/26/23: CONCLUSIONS:\par 1. The left ventricular systolic function is moderately decreased with an ejection fraction visually\par estimated at 40-45 %. Abnormal septal motion consistent with right ventricular pacemaker.\par 2. The basal inferior and basal inferolateral segments are hypokinetic.\par 3. There is mild (grade 1) left ventricular diastolic dysfunction, with elevated left atrial filling pressure.\par 4. Mild to moderate left ventricular hypertrophy.\par 5. Device lead is visualized in the right heart.\par 6. Mild aortic stenosis. Mild aortic regurgitation.\par 7. Mild mitral valve stenosis. Mild to moderate mitral regurgitation. MV RF = 41%.\par 8. Mild tricuspid regurgitation.\par 9. Trace pulmonic regurgitation.\par 10. Compared to the transthoracic echocardiogram performed on 3/28/2022 Mild AS and Mild MS are\par noted.\par \par Carotid u/s 4/26/23: CONCLUSIONS:\par 1. Mild non obstructive atherosclerosis noted bilaterally.\par 2. Vertebral arteries: antegrade flow bilaterally.\par 3. Compared to prior done on 3/28/2022, decrease in elevated velocities and ratios. Tortuousity of distal\par ICAs likely contribute to varying results.\par \par Abd u/s 4/26/23: CONCLUSIONS:\par 1. No evidence of abdominal aortic aneurysm.\par 2. Moderate hertogenous atherosclerosis noted throughout abdominal aorta.\par 3. No prior exam is available for comparison.\par \par EKG 3/6/23 V-paced \par \par Labs 12/9/22: Chol 158, HDL 55, Trigs 93, LDL 84, AST 17, ALT 12. \par \par Has allergy to contrast including rash. \par Family history of stroke. \par Retired nurse.\par Now lives full-time in the [place].

## 2023-05-28 NOTE — ASSESSMENT
[FreeTextEntry1] : \par Recent testing has been reviewed.  \par Left ventricular dysfunction.  \par Dyspnea on exertion.  \par Recommend cardiac catheterization to rule out obstructive coronary artery disease.  \par Request [facility].  \par Follow-up office device check.  Requested outside device check.  \par Clinical follow-up will be scheduled after cardiac catheterization.  \par I discussed indication and possible revascularization.\par Discussed the risks, benefits, alternatives of invasive angiography.\par All questions answered.\par If escalating sx or sx at rest then 911\par Pre med for contrast allergy\par \par Abn nuclear stress test 5/20223 and abnormal echo 4/26/23. \par SOB\par Red flag symptoms reviewed which would warrant emergent evaluation. \par \par HLD: On statin. LDL 65 on most recent labs 3/2023.\par \par H/o syncope and heart block, underwent pacemaker in 2017. 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 Ventricular ectopy including PVCs and nonsustained ventricular tachycardia. \par PAF. Fhx CVA. \par <1% burden\par Back on OAC\par If intolerant of anticoagulation discussed possible left atrial appendage occlusion. \par Follow-up device check. This is followed at [place]. \par Reviewed fall and bleeding precautions. Monitor CBC and renal function. \par \par MR/MVP \par Note LAE, emphasized therapeutic AC. \par Surveillance monitoring \par Does not require SBE prophylaxis. \par \par Carotid atherosclerosis, mod likely overestimated with tortuosity. \par Surveillance monitoring \par Cont statin\par \par Discussed red flag symptoms, which would warrant sooner or emergent medical evaluation.\par Any questions and concerns were addressed and resolved.\par

## 2023-07-10 ENCOUNTER — APPOINTMENT (OUTPATIENT)
Dept: CARDIOLOGY | Facility: CLINIC | Age: 88
End: 2023-07-10
Payer: MEDICARE

## 2023-07-10 VITALS
OXYGEN SATURATION: 98 % | WEIGHT: 136 LBS | SYSTOLIC BLOOD PRESSURE: 124 MMHG | HEIGHT: 68 IN | BODY MASS INDEX: 20.61 KG/M2 | HEART RATE: 82 BPM | DIASTOLIC BLOOD PRESSURE: 66 MMHG

## 2023-07-10 DIAGNOSIS — E78.41 ELEVATED LIPOPROTEIN(A): ICD-10-CM

## 2023-07-10 DIAGNOSIS — Z95.0 PRESENCE OF CARDIAC PACEMAKER: ICD-10-CM

## 2023-07-10 DIAGNOSIS — I47.29 OTHER VENTRICULAR TACHYCARDIA: ICD-10-CM

## 2023-07-10 DIAGNOSIS — I45.9 CONDUCTION DISORDER, UNSPECIFIED: ICD-10-CM

## 2023-07-10 DIAGNOSIS — I87.2 VENOUS INSUFFICIENCY (CHRONIC) (PERIPHERAL): ICD-10-CM

## 2023-07-10 PROCEDURE — 99214 OFFICE O/P EST MOD 30 MIN: CPT

## 2023-07-10 NOTE — REVIEW OF SYSTEMS
[Negative] : Heme/Lymph [Feeling Fatigued] : feeling fatigued [SOB] : shortness of breath [Lower Ext Edema] : lower extremity edema [Dizziness] : dizziness

## 2023-08-06 NOTE — ASSESSMENT
[FreeTextEntry1] :   reviewed cath report non onsx lad disease Follow-up thyroid profile with primary physician.  Discussed upcoming clinical trials.   Compression stockings for venous insufficiency.   Continue statin and long-term anticoagulation.   Follow-up device check.   HLD: On statin. LDL 65 on most recent labs 3/2023.  H/o syncope and heart block, underwent pacemaker in 2017. 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 Ventricular ectopy including PVCs and nonsustained ventricular tachycardia.  PAF. Fhx CVA.  <1% burden Back on OAC If intolerant of anticoagulation discussed possible left atrial appendage occlusion.  Follow-up device check. This is followed at [facility].  Reviewed fall and bleeding precautions. Monitor CBC and renal function.   MR/MVP  Note LAE, emphasized therapeutic AC.  Surveillance monitoring  Does not require SBE prophylaxis.   Carotid atherosclerosis, mod likely overestimated with tortuosity.  Surveillance monitoring  Cont statin  Discussed red flag symptoms, which would warrant sooner or emergent medical evaluation. Any questions and concerns were addressed and resolved.

## 2023-08-06 NOTE — HISTORY OF PRESENT ILLNESS
[FreeTextEntry1] : AMAYA CARPENTER  is a 88 year old  F   H/o syncope and heart block, underwent pacemaker in 2017. 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 Ventricular ectopy including PVCs and nonsustained ventricular tachycardia.  Biotronik pacemaker by Modesto Grier at [facility]. She cont remote monitoring through their clinic.  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 She is now on therapeutic anticoagulation.   She was off AC d/t hematuria. She underwent evaluation with Dr. Dominique. She has cystoscopy and ultrasound. Both of these were unremarkable.  She was then restarted on a/c, no recurrent bleeding. Hgb stable. She has had a history of uro/nephrolithiasis. There is chronic nephrolithiasis.  She may require a  procedure with Dr. Webster.   unable to perform CT with contrast d/t contrast allergy and also adverse rxn to prednisone   Physical activity is limited.  There is dyspnea on exertion.  No chest discomfort.    She is aware of symptoms of orthostasis.   Her functional status is limited.   There is chronic dyspnea on exertion.    Recent blood work with CPK 43 cholesterol 139 LDL 65 creatinine 0.9 Last device check notable for a brief episode of atrial fibrillation  cardiac catheterization June 2023 normal left ventricular function moderate LAD disease End-diastolic pressure 14.  Normal.  Ejection fraction 60%.  No mitral insufficiency.  Right dominant circulation.  50% ostial LAD lesion.  Otherwise angiography normal.  Medical therapy recommended.  [facility].  Dr. Rodriguez.  Testing:  Labs 3/29/23: Chol 139, HDL 60, Trigs 63, LDL 65, Cr 0.92, Na 141, K 5.1, Ca 9.5, AST 17, ALT 14, CK 43  Echo 4/26/23: CONCLUSIONS: 1. The left ventricular systolic function is moderately decreased with an ejection fraction visually estimated at 40-45 %. Abnormal septal motion consistent with right ventricular pacemaker. 2. The basal inferior and basal inferolateral segments are hypokinetic. 3. There is mild (grade 1) left ventricular diastolic dysfunction, with elevated left atrial filling pressure. 4. Mild to moderate left ventricular hypertrophy. 5. Device lead is visualized in the right heart. 6. Mild aortic stenosis. Mild aortic regurgitation. 7. Mild mitral valve stenosis. Mild to moderate mitral regurgitation. MV RF = 41%. 8. Mild tricuspid regurgitation. 9. Trace pulmonic regurgitation. 10. Compared to the transthoracic echocardiogram performed on 3/28/2022 Mild AS and Mild MS are noted.  Carotid u/s 4/26/23: CONCLUSIONS: 1. Mild non obstructive atherosclerosis noted bilaterally. 2. Vertebral arteries: antegrade flow bilaterally. 3. Compared to prior done on 3/28/2022, decrease in elevated velocities and ratios. Tortuousity of distal ICAs likely contribute to varying results.  Abd u/s 4/26/23: CONCLUSIONS: 1. No evidence of abdominal aortic aneurysm. 2. Moderate hertogenous atherosclerosis noted throughout abdominal aorta. 3. No prior exam is available for comparison.  EKG 3/6/23 V-paced   Has allergy to contrast including rash.  Family history of stroke.  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

## 2023-08-10 ENCOUNTER — RX RENEWAL (OUTPATIENT)
Age: 88
End: 2023-08-10

## 2023-08-30 ENCOUNTER — APPOINTMENT (OUTPATIENT)
Dept: SURGICAL ONCOLOGY | Facility: CLINIC | Age: 88
End: 2023-08-30

## 2023-08-30 NOTE — HISTORY OF PRESENT ILLNESS
[de-identified] : 89 y/o female presents for a follow up visit. 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  Plastic reconstruction was performed by Dr. Marislea Maxwell.  Final pathology was 1.2 mm melanoma with a mitotic rate of 1/mm^2, with no ulceration or regression. We decided to not proceed with a sentinel lymph node biopsy due to low risk for mitosis.   Tissue biopsy (22): Left temple,  punch biopsy - Actinic keratosis, early,  US soft tissue head/neck (22): There is a 10 x 3 x 7 mm superficial lymph node in the left level 5 region at the palpable abnormality. The lymph node has benign morphology with fatty hilum. No other lymph nodes are visualized.  LDH 21: 136  Pathology (21): Left cheek, punch biopsy: -Melanoma, approximately 0.7 mm in thickness, mitotic rate 0/mm2 -No ulceration and no regression changes -Tumor staging pT1a   Final Pathology (20):  1 - Left cheek, wide excision: - Melanoma, 1.2 mm in thickness, mitotic rate 1/mm2 - No ulceration and no regresssion changes - Tumor staging pT2a  Dermatology report (20): Left Cheek, Punch Biopsy Malignant Melanoma. Breslow's Thickness: 0.7 mm Mitosis per 1mm2: None in dermal component Lymphoid infiltrate: 1+ Ulceration: absent Regression: absent Margins: extends to both lateral margins Immunohistochemical stains: confirmed with Melan-A, MITF, HMB-45 Stage: T1B  Pt has a family history of skin cancer (brother) and melanoma (maternal uncle) -  from same.  Patient was seen by her dentist for mildly prominent right cervical lymph node secondary to right broken tooth.

## 2023-08-30 NOTE — ADDENDUM
[FreeTextEntry1] : I, Bina Li, acted solely as a scribe for Dr. Jay Woodson on this date 08/30/2023.

## 2023-08-30 NOTE — PHYSICAL EXAM
[de-identified] : 3 mm left posterior cervical lymph node level 5.  us shows prominent subglandular node. no suspicious adenopathy.  [de-identified] : left cheek reconstruction well healed. no evidence of recurrence. Multiple benign-appearing pigmented lesions head, neck, trunk, extremities.

## 2023-10-30 ENCOUNTER — APPOINTMENT (OUTPATIENT)
Dept: CARDIOLOGY | Facility: CLINIC | Age: 88
End: 2023-10-30
Payer: MEDICARE

## 2023-10-30 VITALS
OXYGEN SATURATION: 98 % | WEIGHT: 139 LBS | BODY MASS INDEX: 21.07 KG/M2 | DIASTOLIC BLOOD PRESSURE: 72 MMHG | SYSTOLIC BLOOD PRESSURE: 126 MMHG | HEIGHT: 68 IN | HEART RATE: 91 BPM

## 2023-10-30 DIAGNOSIS — Z95.0 PRESENCE OF CARDIAC PACEMAKER: ICD-10-CM

## 2023-10-30 DIAGNOSIS — E78.5 HYPERLIPIDEMIA, UNSPECIFIED: ICD-10-CM

## 2023-10-30 DIAGNOSIS — I48.0 PAROXYSMAL ATRIAL FIBRILLATION: ICD-10-CM

## 2023-10-30 DIAGNOSIS — I25.10 ATHEROSCLEROTIC HEART DISEASE OF NATIVE CORONARY ARTERY W/OUT ANGINA PECTORIS: ICD-10-CM

## 2023-10-30 DIAGNOSIS — Z71.89 OTHER SPECIFIED COUNSELING: ICD-10-CM

## 2023-10-30 DIAGNOSIS — I42.9 CARDIOMYOPATHY, UNSPECIFIED: ICD-10-CM

## 2023-10-30 DIAGNOSIS — I34.0 NONRHEUMATIC MITRAL (VALVE) INSUFFICIENCY: ICD-10-CM

## 2023-10-30 PROCEDURE — 99214 OFFICE O/P EST MOD 30 MIN: CPT

## 2023-10-30 RX ORDER — ATORVASTATIN CALCIUM 40 MG/1
40 TABLET, FILM COATED ORAL
Qty: 90 | Refills: 2 | Status: ACTIVE | COMMUNITY
Start: 2021-05-11 | End: 1900-01-01

## 2023-10-30 RX ORDER — LORAZEPAM 0.5 MG/1
0.5 TABLET ORAL
Qty: 10 | Refills: 0 | Status: DISCONTINUED | COMMUNITY
Start: 2023-03-06 | End: 2023-10-30

## 2023-10-30 RX ORDER — LEVOTHYROXINE SODIUM 112 UG/1
112 TABLET ORAL DAILY
Refills: 0 | Status: ACTIVE | COMMUNITY

## 2024-04-19 ENCOUNTER — APPOINTMENT (OUTPATIENT)
Dept: CARDIOLOGY | Facility: CLINIC | Age: 89
End: 2024-04-19

## 2024-04-25 RX ORDER — APIXABAN 5 MG/1
5 TABLET, FILM COATED ORAL
Qty: 180 | Refills: 1 | Status: ACTIVE | COMMUNITY
Start: 2022-03-14 | End: 1900-01-01

## 2024-06-14 NOTE — HISTORY OF PRESENT ILLNESS
[FreeTextEntry1] : AMAYA CARPENTER  is a 89 year old  F   H/o syncope and heart block, underwent pacemaker in 2017. 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 Ventricular ectopy including PVCs and nonsustained ventricular tachycardia. Biotronik pacemaker by Modesto Grier at [facility]. She cont remote monitoring through their clinic. 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 She is now on therapeutic anticoagulation.  She was off AC d/t hematuria. She underwent evaluation with Dr. Dominique. She has cystoscopy and ultrasound. Both of these were unremarkable. She was then restarted on a/c, no recurrent bleeding. Hgb stable. She has had a history of uro/nephrolithiasis. There is chronic nephrolithiasis.   She is moving to [place]. There is appropriate stress.  She has had symptoms of palpitations. Physical activity is limited. There is dyspnea on exertion. No chest discomfort.  Last device check unremarkable  last blood work October 2023 LDL 77 creatinine 1.0 A1c 5.3 TSH 1.8 hemoglobin 14.5  cardiac catheterization June 2023 normal left ventricular function moderate LAD disease End-diastolic pressure 14. Normal. Ejection fraction 60%. No mitral insufficiency. Right dominant circulation. 50% ostial LAD lesion. Otherwise angiography normal. Medical therapy recommended. [facility]. Dr. Rodriguez.  Echo 4/26/23: CONCLUSIONS: 1. The left ventricular systolic function is moderately decreased with an ejection fraction visually estimated at 40-45 %. Abnormal septal motion consistent with right ventricular pacemaker. 2. The basal inferior and basal inferolateral segments are hypokinetic. 3. There is mild (grade 1) left ventricular diastolic dysfunction, with elevated left atrial filling pressure. 4. Mild to moderate left ventricular hypertrophy. 5. Device lead is visualized in the right heart. 6. Mild aortic stenosis. Mild aortic regurgitation. 7. Mild mitral valve stenosis. Mild to moderate mitral regurgitation. MV RF = 41%. 8. Mild tricuspid regurgitation. 9. Trace pulmonic regurgitation. 10. Compared to the transthoracic echocardiogram performed on 3/28/2022 Mild AS and Mild MS are noted.  Carotid u/s 4/26/23: CONCLUSIONS: 1. Mild non obstructive atherosclerosis noted bilaterally. 2. Vertebral arteries: antegrade flow bilaterally. 3. Compared to prior done on 3/28/2022, decrease in elevated velocities and ratios. Tortuousity of distal ICAs likely contribute to varying results.  Abd u/s 4/26/23: CONCLUSIONS: 1. No evidence of abdominal aortic aneurysm. 2. Moderate hertogenous atherosclerosis noted throughout abdominal aorta. 3. No prior exam is available for comparison.  Has allergy to contrast including rash. Family history of stroke. 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  Recent blood work device check has been reviewed. A follow-up device check has been requested.  Follow-up echocardiogram.   reviewed cath report non onsx lad disease Follow-up thyroid profile with primary physician. Compression stockings for venous insufficiency. Continue statin and long-term anticoagulation.  HLD: On statin.  H/o syncope and heart block, underwent pacemaker in 2017. 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 Ventricular ectopy including PVCs and nonsustained ventricular tachycardia. PAF. Fhx CVA. <1% burden Back on OAC If intolerant of anticoagulation discussed possible left atrial appendage occlusion. Follow-up device check. This is followed at [place]. Reviewed fall and bleeding precautions. Monitor CBC and renal function.  MR/MVP Note LAE, emphasized therapeutic AC. Surveillance monitoring Does not require SBE prophylaxis.  Carotid atherosclerosis, mod likely overestimated with tortuosity. Surveillance monitoring Cont statin  Discussed red flag symptoms, which would warrant sooner or emergent medical evaluation. Any questions and concerns were addressed and resolved.

## 2024-06-18 ENCOUNTER — APPOINTMENT (OUTPATIENT)
Dept: CARDIOLOGY | Facility: CLINIC | Age: 89
End: 2024-06-18